# Patient Record
Sex: FEMALE | Race: AMERICAN INDIAN OR ALASKA NATIVE | Employment: PART TIME | URBAN - METROPOLITAN AREA
[De-identification: names, ages, dates, MRNs, and addresses within clinical notes are randomized per-mention and may not be internally consistent; named-entity substitution may affect disease eponyms.]

---

## 2018-04-18 ENCOUNTER — TRANSFERRED RECORDS (OUTPATIENT)
Dept: HEALTH INFORMATION MANAGEMENT | Facility: CLINIC | Age: 38
End: 2018-04-18

## 2018-07-25 ENCOUNTER — TRANSFERRED RECORDS (OUTPATIENT)
Dept: HEALTH INFORMATION MANAGEMENT | Facility: CLINIC | Age: 38
End: 2018-07-25

## 2018-09-11 ENCOUNTER — TRANSFERRED RECORDS (OUTPATIENT)
Dept: HEALTH INFORMATION MANAGEMENT | Facility: CLINIC | Age: 38
End: 2018-09-11

## 2018-10-25 ENCOUNTER — TRANSFERRED RECORDS (OUTPATIENT)
Dept: HEALTH INFORMATION MANAGEMENT | Facility: CLINIC | Age: 38
End: 2018-10-25

## 2019-02-11 ENCOUNTER — MEDICAL CORRESPONDENCE (OUTPATIENT)
Dept: HEALTH INFORMATION MANAGEMENT | Facility: CLINIC | Age: 39
End: 2019-02-11

## 2019-04-01 ENCOUNTER — MEDICAL CORRESPONDENCE (OUTPATIENT)
Dept: HEALTH INFORMATION MANAGEMENT | Facility: CLINIC | Age: 39
End: 2019-04-01

## 2019-04-05 ENCOUNTER — MEDICAL CORRESPONDENCE (OUTPATIENT)
Dept: HEALTH INFORMATION MANAGEMENT | Facility: CLINIC | Age: 39
End: 2019-04-05

## 2019-04-17 ENCOUNTER — TRANSFERRED RECORDS (OUTPATIENT)
Dept: HEALTH INFORMATION MANAGEMENT | Facility: CLINIC | Age: 39
End: 2019-04-17

## 2019-04-18 ENCOUNTER — TRANSFERRED RECORDS (OUTPATIENT)
Dept: HEALTH INFORMATION MANAGEMENT | Facility: CLINIC | Age: 39
End: 2019-04-18

## 2019-04-20 ENCOUNTER — TRANSFERRED RECORDS (OUTPATIENT)
Dept: HEALTH INFORMATION MANAGEMENT | Facility: CLINIC | Age: 39
End: 2019-04-20

## 2020-11-10 ENCOUNTER — TRANSFERRED RECORDS (OUTPATIENT)
Dept: HEALTH INFORMATION MANAGEMENT | Facility: CLINIC | Age: 40
End: 2020-11-10

## 2021-01-18 ENCOUNTER — TRANSFERRED RECORDS (OUTPATIENT)
Dept: HEALTH INFORMATION MANAGEMENT | Facility: CLINIC | Age: 41
End: 2021-01-18

## 2021-02-09 ENCOUNTER — MEDICAL CORRESPONDENCE (OUTPATIENT)
Dept: HEALTH INFORMATION MANAGEMENT | Facility: CLINIC | Age: 41
End: 2021-02-09

## 2021-02-10 ENCOUNTER — TRANSCRIBE ORDERS (OUTPATIENT)
Dept: OTHER | Age: 41
End: 2021-02-10

## 2021-02-10 DIAGNOSIS — M79.671 BILATERAL LEG AND FOOT PAIN: Primary | ICD-10-CM

## 2021-02-10 DIAGNOSIS — M79.604 BILATERAL LEG AND FOOT PAIN: Primary | ICD-10-CM

## 2021-02-10 DIAGNOSIS — M79.605 BILATERAL LEG AND FOOT PAIN: Primary | ICD-10-CM

## 2021-02-10 DIAGNOSIS — R25.2 CRAMPING OF FEET: ICD-10-CM

## 2021-02-10 DIAGNOSIS — M79.672 BILATERAL LEG AND FOOT PAIN: Primary | ICD-10-CM

## 2021-02-10 DIAGNOSIS — R25.2 LEG CRAMPING: ICD-10-CM

## 2021-03-10 ENCOUNTER — PRE VISIT (OUTPATIENT)
Dept: NEUROLOGY | Facility: CLINIC | Age: 41
End: 2021-03-10

## 2021-03-10 NOTE — TELEPHONE ENCOUNTER
FUTURE VISIT INFORMATION      FUTURE VISIT INFORMATION:    Date: 3/12/2021    Time: 915am    [place] [facility] [place]  REFERRAL INFORMATION:    Referring provider:  Dr. German     Referring providers clinic: [facility]     Reason for visit/diagnosis  Bilateral Leg and Foot Pain    RECORDS REQUESTED FROM:       Clinic name Comments Records Status Imaging Status   [facility]  Dr. German -1/18/2021    Dr. Whipple -9/18/2020 Scanned to Chart N/A          [facility] MR Lumbar Spine-10/2/2017, 11/28/2016, 11/17/2016    CT Head-1/12/2017    CT Lumbar Spine-1/12/2017    CT Cervical Spine-1/12/2017 Care EVerywhere Requested to PACS          [facility]  CT Lumbar Spine-7/25/2018 Scanned to Chart Requested to PACS            3/10/2021-Request for Images faxed to [facility] for Images to be pushed ASAP-MR @ 631am    3/12/2021-[facility] Images now in PACS-MR @ 5am

## 2021-03-12 ENCOUNTER — OFFICE VISIT (OUTPATIENT)
Dept: NEUROLOGY | Facility: CLINIC | Age: 41
End: 2021-03-12
Attending: ORTHOPAEDIC SURGERY
Payer: COMMERCIAL

## 2021-03-12 VITALS
RESPIRATION RATE: 16 BRPM | DIASTOLIC BLOOD PRESSURE: 75 MMHG | HEIGHT: 71 IN | BODY MASS INDEX: 26.46 KG/M2 | HEART RATE: 71 BPM | OXYGEN SATURATION: 99 % | SYSTOLIC BLOOD PRESSURE: 123 MMHG | WEIGHT: 189 LBS

## 2021-03-12 DIAGNOSIS — G25.81 RESTLESS LEGS SYNDROME: ICD-10-CM

## 2021-03-12 DIAGNOSIS — R25.2 CRAMP OF LIMB: ICD-10-CM

## 2021-03-12 DIAGNOSIS — R20.0 SENSORY LOSS: ICD-10-CM

## 2021-03-12 DIAGNOSIS — R25.2 CRAMP OF LIMB: Primary | ICD-10-CM

## 2021-03-12 LAB
ERYTHROCYTE [DISTWIDTH] IN BLOOD BY AUTOMATED COUNT: 13.1 % (ref 10–15)
FERRITIN SERPL-MCNC: 146 NG/ML (ref 12–150)
HBA1C MFR BLD: 5.2 % (ref 0–5.6)
HCT VFR BLD AUTO: 44.5 % (ref 35–47)
HCV AB SERPL QL IA: NONREACTIVE
HGB BLD-MCNC: 14.3 G/DL (ref 11.7–15.7)
MCH RBC QN AUTO: 32.3 PG (ref 26.5–33)
MCHC RBC AUTO-ENTMCNC: 32.1 G/DL (ref 31.5–36.5)
MCV RBC AUTO: 101 FL (ref 78–100)
PLATELET # BLD AUTO: 267 10E9/L (ref 150–450)
RBC # BLD AUTO: 4.43 10E12/L (ref 3.8–5.2)
WBC # BLD AUTO: 13.4 10E9/L (ref 4–11)

## 2021-03-12 PROCEDURE — 82784 ASSAY IGA/IGD/IGG/IGM EACH: CPT | Performed by: PATHOLOGY

## 2021-03-12 PROCEDURE — 82728 ASSAY OF FERRITIN: CPT | Performed by: PATHOLOGY

## 2021-03-12 PROCEDURE — 85027 COMPLETE CBC AUTOMATED: CPT | Performed by: PATHOLOGY

## 2021-03-12 PROCEDURE — 99203 OFFICE O/P NEW LOW 30 MIN: CPT | Performed by: PSYCHIATRY & NEUROLOGY

## 2021-03-12 PROCEDURE — 86334 IMMUNOFIX E-PHORESIS SERUM: CPT | Performed by: PATHOLOGY

## 2021-03-12 PROCEDURE — 36415 COLL VENOUS BLD VENIPUNCTURE: CPT | Performed by: PATHOLOGY

## 2021-03-12 PROCEDURE — 83036 HEMOGLOBIN GLYCOSYLATED A1C: CPT | Performed by: PATHOLOGY

## 2021-03-12 PROCEDURE — 86803 HEPATITIS C AB TEST: CPT | Performed by: PATHOLOGY

## 2021-03-12 RX ORDER — BACLOFEN 10 MG/1
20 TABLET ORAL 3 TIMES DAILY
COMMUNITY
Start: 2021-02-17

## 2021-03-12 RX ORDER — FLUTICASONE PROPIONATE 50 MCG
2 SPRAY, SUSPENSION (ML) NASAL
COMMUNITY
Start: 2021-02-09

## 2021-03-12 RX ORDER — FAMOTIDINE 40 MG/1
40 TABLET, FILM COATED ORAL
COMMUNITY
Start: 2021-02-09 | End: 2022-02-09

## 2021-03-12 RX ORDER — GABAPENTIN 300 MG/1
900 CAPSULE ORAL 3 TIMES DAILY
COMMUNITY
Start: 2021-03-10

## 2021-03-12 RX ORDER — VALACYCLOVIR HYDROCHLORIDE 1 G/1
TABLET, FILM COATED ORAL
COMMUNITY
Start: 2020-10-13

## 2021-03-12 RX ORDER — TRIAMCINOLONE ACETONIDE 1 MG/G
CREAM TOPICAL
COMMUNITY
Start: 2020-03-09

## 2021-03-12 RX ORDER — LEVOTHYROXINE SODIUM 25 UG/1
TABLET ORAL
COMMUNITY
Start: 2020-06-15

## 2021-03-12 RX ORDER — ACETAMINOPHEN 500 MG
1000 TABLET ORAL
COMMUNITY

## 2021-03-12 RX ORDER — AZELASTINE 1 MG/ML
2 SPRAY, METERED NASAL
COMMUNITY
Start: 2021-02-09 | End: 2022-02-09

## 2021-03-12 RX ORDER — LIDOCAINE 50 MG/G
1 PATCH TOPICAL
COMMUNITY

## 2021-03-12 RX ORDER — FEXOFENADINE HCL 180 MG/1
180 TABLET ORAL
COMMUNITY
Start: 2020-11-18

## 2021-03-12 RX ORDER — HYDROCODONE BITARTRATE AND ACETAMINOPHEN 5; 325 MG/1; MG/1
TABLET ORAL
COMMUNITY
Start: 2020-12-15

## 2021-03-12 RX ORDER — NAPROXEN SODIUM 220 MG
440 TABLET ORAL
COMMUNITY

## 2021-03-12 ASSESSMENT — PAIN SCALES - GENERAL: PAINLEVEL: SEVERE PAIN (7)

## 2021-03-12 ASSESSMENT — MIFFLIN-ST. JEOR: SCORE: 1628.43

## 2021-03-12 NOTE — NURSING NOTE
Chief Complaint   Patient presents with     Consult     [facility] New - bilateral leg and foot pain, cramping of feet and legs     Parth Perez

## 2021-03-12 NOTE — PROGRESS NOTES
Service Date: 2021      Danial German MD   [facility]   [address] [place], [place] [id number]       RE: Kevyn Dillon   MRN: [id number]   : 1980      Dear Dr. German:      I saw Kevyn Dillon for neurologic evaluation on 2021.  She is a 40-year-old female here for evaluation of lower extremity cramps and pain.      She indicates this has been an issue for many years.  She will get severe cramps in her feet, thighs and calves.  She does tell me, when she was a child, she had braces on for a while because of short Achilles tendons and also later when she was older had an Achilles tendon release on the left.      The discomfort in her legs is mainly when she is at rest.  It is worse at night.  She does get a feeling like she has to get up and move when she is lying in bed or sitting.      She also reports numbness in her toes.      She has had 5 lumbar spinal surgeries, dating back to age 28.  She has had fusions at L4-L5 and L5-S1.  She now has a spinal stimulator for back pain.      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      She has tried various remedies to help with the cramps and pain.  This has included stretching, physical therapy and Biofreeze.  She currently is using baclofen and gabapentin, which have not been of great relief for her.      I was able to review laboratory testing that has been done over the past couple of years.  She has had a normal CBC, TSH, B12, sedimentation rate, C-reactive protein, CLARI, rheumatoid factor, CCP, CK, and comprehensive metabolic panel, Lyme serology was negative.      PAST MEDICAL HISTORY:  Notable for hypothyroidism, gestational diabetes, and nonalcoholic fatty liver disease.      CURRENT MEDICATIONS:   1.  Tylenol.   2.  Astelin nasal spray.   3.  Baclofen 10 mg 3 times a day.   4.  Vitamin D.   5.  Famotidine.   6.  Allegra.   7.  Flonase.   8.  Gabapentin 900 mg 3 times a day.   9.  Hydrocodone.    10.  Levothyroxine.   11.  Lidoderm patch.   12.  Naproxen.   13.  Kenalog   14.  Valtrex.      ALLERGIES:  She is allergic to codeine, cyclobenzaprine, penicillin, liquid adhesive and adhesive on fentanyl patches.      FAMILY HISTORY:  Notable for her mother having restless legs.  Her mother also had neuropathy, but she was diabetic.      SOCIAL HISTORY:  She works as a .  She does smoke.  She does not use alcohol.      PHYSICAL EXAMINATION:   Examination reveals a patient who is alert and cooperative.  Heart rate 71.  Blood pressure 123/75.     Cranial nerves II-XII are intact.  Motor examination reveals intact strength.  On sensory examination, she has a slight loss of position sense in the toes.  She has near absent vibratory sense in the toes.  She reports decreased pinprick to the ankles.  Finger-to-nose is done well.  Gait is notable for stiffness related to her back.  Reflexes are 1+ in the upper extremities, 1+ at the knees, and absent at the ankles.  Plantar responses are flexor.      IMPRESSION:   1.  Chronic lower extremity pain and cramps.   2.  History of 5 lumbar spinal surgeries.   3.  Possible peripheral neuropathy.   4.  Possible restless leg syndrome.      PLAN:  I discussed further evaluation.  I think in part her symptoms could relate to restless legs, but she is most concerned about the cramps that she has in her legs, which are visible at times.      She is going to have some additional blood work done which will include a hemoglobin A1c, hepatitis C antibody, serum immunofixation, ferritin, and CBC.  I will communicate those blood test results to her when available.     (ADDENDUM 3/15/21: LABS OK MILD ELEVATION WBC UNLIKELY SIGNIFIANT CALLED PATIENT)      She is going to return for lower extremity EMG and nerve conduction studies.      For her cramps, I suggested she start taking vitamin E at a dose of 400 to 800 units a day and also a B vitamin.        Total visit time was 40 minutes.   This included a review of available medical records, history, examination, counseling, care coordination, and documentation.     ADDENDUM 3/19/21: EMG normal. Can't rule out small fiber neuropathy. She is open to exploring this further with skin biopsy.     21: Skin biopsy negative. Discussed with patient. She is taking Vitamin E now which not helping. Advised taking B complex vitamin. Already on Gabapentin and Baclofen. Allergic to cyclobenzaprine. Discussed trying calcium channel blocker but she tells me she runs a low resting heart rate. She really would like to know the cause of her cramps and testing has not identified. She would be interested in another opinion and I am referring her to Dr Trinidad.      Sincerely,            MD DEBBY Mack MD             D: 2021   T: 2021   MT: ROBINSON      Name:     FRED AWAD   MRN:      [id number]        Account:      [id number]   :      1980           Service Date: 2021      Document: [id number]

## 2021-03-15 LAB
IGA SERPL-MCNC: 152 MG/DL (ref 84–499)
IGG SERPL-MCNC: 860 MG/DL (ref 610–1616)
IGM SERPL-MCNC: 82 MG/DL (ref 35–242)
PROT PATTERN SERPL IFE-IMP: NORMAL

## 2021-03-18 ENCOUNTER — OFFICE VISIT (OUTPATIENT)
Dept: NEUROLOGY | Facility: CLINIC | Age: 41
End: 2021-03-18
Attending: PSYCHIATRY & NEUROLOGY
Payer: COMMERCIAL

## 2021-03-18 DIAGNOSIS — R20.0 SENSORY LOSS: ICD-10-CM

## 2021-03-18 DIAGNOSIS — R25.2 CRAMP OF LIMB: ICD-10-CM

## 2021-03-18 PROCEDURE — 95911 NRV CNDJ TEST 9-10 STUDIES: CPT | Performed by: PSYCHIATRY & NEUROLOGY

## 2021-03-18 PROCEDURE — 95885 MUSC TST DONE W/NERV TST LIM: CPT | Performed by: PSYCHIATRY & NEUROLOGY

## 2021-03-18 NOTE — PROGRESS NOTES
[facility]  Electrodiagnostic Laboratory    Nerve Conduction & EMG Report          Patient: Kevyn Dillon  YOB: 1980  Patient ID: [id number]  Age: 40 Years 10 Months  Gender: Female    Referring Physician: Thien Duke MD      History & Examination:    40 year old woman with chief complaint of cramping in her hands and feet, accompanied by tingling. Her mother (and possibly her son) have the same symptoms. Query polyneuropathy, myopathy, myotonic disorder, or other explanation for chief complaint.     Techniques:    Sensory and motor conduction studies were done with surface recording electrodes. EMG was done with a concentric needle electrode.     Results:    Left median, left superficial peroneal, and bilateral sural antidromic sensory NCSs were normal. Left median, bilateral deep peroneal, and bilateral tibial motor NCSs were normal without significant side-to-side differences. EMG of left TA, medial gastrocnemius, vastus lateralis, FDI, triceps, and deltoid was normal.     Interpretation:    Normal study. No electrodiagnostic evidence of large fiber polyneuropathy, myopathy, myotonic disorder, or peripheral nerve hyperexcitability syndrome. A purely small fiber neuropathy cannot be excluded.     EMG Physician:    Alvin Trinidad MD         Sensory NCS      Nerve / Sites Rec. Site Onset Peak NP Amp Ref. PP Amp Dist Alli Ref. Temp     ms ms  V  V  V cm m/s m/s  C   L MEDIAN - Dig II Anti      Wrist Dig II 2.24 2.92 26.7 10.0 43.4 14 62.5 48.0 32.5   L SURAL - Lat Mall      Calf Ankle 2.97 3.75 20.4 8.0 26.2 14 47.2 38.0 31.5   R SURAL - Lat Mall      Calf Ankle 2.92 3.65 14.0 8.0 15.1 14 48.0 38.0 31.4   L SUP PERONEAL      Lat Leg Berg 2.81 3.65 11.0  11.2 12.5 44.4 38.0 31.5       Motor NCS      Nerve / Sites Rec. Site Lat Ref. Amp Ref. Rel Amp Dist Alli Ref. Dur. Area Temp.     ms ms mV mV % cm m/s m/s ms %  C   L MEDIAN - APB      Wrist APB 2.86 4.40 12.5 5.0 100 8   6.04  100 32.5      Elbow APB 6.15  11.0  87.6 21.5 65.5 48.0 6.51 85.3 32.4   L DEEP PERONEAL - EDB      Ankle EDB 3.33 6.00 7.2 2.5 100 8   5.57 100 31.5      FibHead EDB 9.27  6.8  94.9 30.5 51.4 38.0 5.99 95.8 31.4      Pop Fos EDB 11.41  6.8  94.4 9.5 44.5 38.0 5.94 96.9 31.4   R DEEP PERONEAL - EDB      Ankle EDB 3.65 6.00 6.6 2.5 100 8   5.05 100 31.4   L TIBIAL - AH      Ankle AH 3.44 6.00 13.4 4.0 100 8   5.89 100 31.5      Pop Fos AH 11.25  10.2  75.9 38 48.6 38.0 7.76 89.5 31.4   R TIBIAL - AH      Ankle AH 3.39 6.00 10.1 4.0 100 8   6.72 100 31.5       EMG Summary Table     Spontaneous MUAP Recruitment    IA Fib/PSW Fasc H.F. Amp Dur. PPP Pattern   L. TIB ANTERIOR N None None None N N None Normal   L. GASTROCN (MED) N None None None N N None Normal   L. VAST LATERALIS N None None None N N None Normal   L. TRICEPS N None None None N N None Normal   L. FIRST D INTEROSS N None None None N N None Normal   L. DELTOID N None None None N N None Normal

## 2021-03-18 NOTE — LETTER
3/18/2021       RE: Kevyn Dillon  [address]  [facility] [address]     Dear Colleague,    Thank you for referring your patient, Kevyn Dillon, to the [facility] EM[facility] [place] at [facility]. Please see a copy of my visit note below.        [facility]  Electrodiagnostic Laboratory    Nerve Conduction & EMG Report          Patient: Kevyn Dillon  YOB: 1980  Patient ID: [id number]  Age: 40 Years 10 Months  Gender: Female    Referring Physician: Thien Duke MD      History & Examination:    40 year old woman with chief complaint of cramping in her hands and feet, accompanied by tingling. Her mother (and possibly her son) have the same symptoms. Query polyneuropathy, myopathy, myotonic disorder, or other explanation for chief complaint.     Techniques:    Sensory and motor conduction studies were done with surface recording electrodes. EMG was done with a concentric needle electrode.     Results:    Left median, left superficial peroneal, and bilateral sural antidromic sensory NCSs were normal. Left median, bilateral deep peroneal, and bilateral tibial motor NCSs were normal without significant side-to-side differences. EMG of left TA, medial gastrocnemius, vastus lateralis, FDI, triceps, and deltoid was normal.     Interpretation:    Normal study. No electrodiagnostic evidence of large fiber polyneuropathy, myopathy, myotonic disorder, or peripheral nerve hyperexcitability syndrome. A purely small fiber neuropathy cannot be excluded.     EMG Physician:    Alvin Trinidad MD         Sensory NCS      Nerve / Sites Rec. Site Onset Peak NP Amp Ref. PP Amp Dist Alli Ref. Temp     ms ms  V  V  V cm m/s m/s  C   L MEDIAN - Dig II Anti      Wrist Dig II 2.24 2.92 26.7 10.0 43.4 14 62.5 48.0 32.5   L SURAL - Lat Mall      Calf Ankle 2.97 3.75 20.4 8.0 26.2 14 47.2 38.0 31.5   R SURAL - Lat Mall      Calf Ankle 2.92 3.65  14.0 8.0 15.1 14 48.0 38.0 31.4   L SUP PERONEAL      Lat Leg Berg 2.81 3.65 11.0  11.2 12.5 44.4 38.0 31.5       Motor NCS      Nerve / Sites Rec. Site Lat Ref. Amp Ref. Rel Amp Dist Alli Ref. Dur. Area Temp.     ms ms mV mV % cm m/s m/s ms %  C   L MEDIAN - APB      Wrist APB 2.86 4.40 12.5 5.0 100 8   6.04 100 32.5      Elbow APB 6.15  11.0  87.6 21.5 65.5 48.0 6.51 85.3 32.4   L DEEP PERONEAL - EDB      Ankle EDB 3.33 6.00 7.2 2.5 100 8   5.57 100 31.5      FibHead EDB 9.27  6.8  94.9 30.5 51.4 38.0 5.99 95.8 31.4      Pop Fos EDB 11.41  6.8  94.4 9.5 44.5 38.0 5.94 96.9 31.4   R DEEP PERONEAL - EDB      Ankle EDB 3.65 6.00 6.6 2.5 100 8   5.05 100 31.4   L TIBIAL - AH      Ankle AH 3.44 6.00 13.4 4.0 100 8   5.89 100 31.5      Pop Fos AH 11.25  10.2  75.9 38 48.6 38.0 7.76 89.5 31.4   R TIBIAL - AH      Ankle AH 3.39 6.00 10.1 4.0 100 8   6.72 100 31.5       EMG Summary Table     Spontaneous MUAP Recruitment    IA Fib/PSW Fasc H.F. Amp Dur. PPP Pattern   L. TIB ANTERIOR N None None None N N None Normal   L. GASTROCN (MED) N None None None N N None Normal   L. VAST LATERALIS N None None None N N None Normal   L. TRICEPS N None None None N N None Normal   L. FIRST D INTEROSS N None None None N N None Normal   L. DELTOID N None None None N N None Normal                                Again, thank you for allowing me to participate in the care of your patient.      Sincerely,    Alvin Trinidad MD

## 2021-03-19 DIAGNOSIS — G60.3 IDIOPATHIC PROGRESSIVE POLYNEUROPATHY: Primary | ICD-10-CM

## 2021-04-06 ENCOUNTER — DOCUMENTATION ONLY (OUTPATIENT)
Dept: NEUROLOGY | Facility: CLINIC | Age: 41
End: 2021-04-06

## 2021-04-06 ENCOUNTER — OFFICE VISIT (OUTPATIENT)
Dept: NEUROLOGY | Facility: CLINIC | Age: 41
End: 2021-04-06
Attending: PSYCHIATRY & NEUROLOGY
Payer: COMMERCIAL

## 2021-04-06 DIAGNOSIS — G60.3 IDIOPATHIC PROGRESSIVE POLYNEUROPATHY: ICD-10-CM

## 2021-04-06 PROCEDURE — 11104 PUNCH BX SKIN SINGLE LESION: CPT | Performed by: PSYCHIATRY & NEUROLOGY

## 2021-04-06 PROCEDURE — 11105 PUNCH BX SKIN EA SEP/ADDL: CPT | Performed by: PSYCHIATRY & NEUROLOGY

## 2021-04-06 NOTE — PROGRESS NOTES
Procedure note: Skin biopsy. Indication: sensory disturbance, skin. After obtaining informed consent, 3 mm PUNCH skin biopsies were performed over the extensor digitorum brevis muscle of the left foot, and the left medial calf, about 10 cm distally to the medial knee joint. 1% lidocaine anesthesia and betadine sterile prep were used. The patient tolerated the procedure well. Wound care and patient education were provided by VIMAL Lofton. Following completion of the procedure, both the performing physician and the assistant, independently verified the presence of one skin sample in each of the two (2) vials sent to the Lab. The procedure was performed by Neuromuscular Fellow Dr Rodriguez under my continuous supervision at all stages. Alivn Trinidad MD

## 2021-04-06 NOTE — LETTER
4/6/2021       RE: Kevny Dillon  [address]  [facility] [address]     Dear Colleague,    Thank you for referring your patient, Kevyn Dillon, to the [facility] EMG CLINIC [place] at [facility]. Please see a copy of my visit note below.    Procedure note: Skin biopsy. Indication: sensory disturbance, skin. After obtaining informed consent, 3 mm PUNCH skin biopsies were performed over the extensor digitorum brevis muscle of the left foot, and the left medial calf, about 10 cm distally to the medial knee joint. 1% lidocaine anesthesia and betadine sterile prep were used. The patient tolerated the procedure well. Wound care and patient education were provided by VIMAL Lofton. Following completion of the procedure, both the performing physician and the assistant, independently verified the presence of one skin sample in each of the two (2) vials sent to the Lab. The procedure was performed by Neuromuscular Fellow Dr Rodriguez under my continuous supervision at all stages. Alvin Trinidad MD        Again, thank you for allowing me to participate in the care of your patient.      Sincerely,    Alvin Trinidad MD

## 2021-04-06 NOTE — PROGRESS NOTES
After two-point verification two skin biopsy samples, left foot and left calf, were sent via tube system to the core lab at the [facility] to be couriered to [place] for processing. [facility] was notified via email to expect these samples. Proper paperwork was included.

## 2021-04-19 LAB — LAB SCANNED RESULT: NORMAL

## 2021-04-21 DIAGNOSIS — R25.2 MUSCLE CRAMPS: Primary | ICD-10-CM

## 2021-05-01 ENCOUNTER — HEALTH MAINTENANCE LETTER (OUTPATIENT)
Age: 41
End: 2021-05-01

## 2021-05-03 NOTE — TELEPHONE ENCOUNTER
RECORDS RECEIVED FROM: Internal   Date of Appt: 8/16/21   NOTES (FOR ALL VISITS) STATUS DETAILS   OFFICE NOTE from referring provider Internal Dr Duke @ [facility] Neurology:  3/12/21   OFFICE NOTE from other specialist N/A    DISCHARGE SUMMARY from hospital N/A    DISCHARGE REPORT from the ER N/A    OPERATIVE REPORT Internal [facility]:  4/6/21  Skin Bx   MEDICATION LIST Internal    IMAGING  (FOR ALL VISITS)     EMG Internal [facility]:  3/18/21   EEG N/A    LUMBAR PUNCTURE N/A    SULAIMAN SCAN N/A    ULTRASOUND (CAROTID BILAT) *VASCULAR* N/A    MRI (HEAD, NECK, SPINE) Received [facility]:  MRI Lumbar Spine 4/18/18    [facility]:  MRI Lumbar Spine 10/2/17   CT (HEAD, NECK, SPINE) Received [facility]:  CT Lumbar Spine 7/25/18    [facility]:  CT Lumbar Spine 1/12/17  CT Head 1/12/17  CT Cervical Spine 1/12/17      Action 5/3/21 MV 2.57pm   Action Taken Imaging request faxed to [facility] for:  MRI Lumbar Spine 4/18/18  CT Lumbar Spine 7/25/18     Action 5/4/21 MV 1.54pm   Action Taken Imaging received from [facility] and resolved in PACS

## 2021-08-16 ENCOUNTER — OFFICE VISIT (OUTPATIENT)
Dept: NEUROLOGY | Facility: CLINIC | Age: 41
End: 2021-08-16
Attending: PSYCHIATRY & NEUROLOGY
Payer: COMMERCIAL

## 2021-08-16 ENCOUNTER — PRE VISIT (OUTPATIENT)
Dept: NEUROLOGY | Facility: CLINIC | Age: 41
End: 2021-08-16

## 2021-08-16 VITALS
BODY MASS INDEX: 26.74 KG/M2 | HEIGHT: 71 IN | HEART RATE: 80 BPM | WEIGHT: 191 LBS | RESPIRATION RATE: 16 BRPM | DIASTOLIC BLOOD PRESSURE: 73 MMHG | SYSTOLIC BLOOD PRESSURE: 108 MMHG | OXYGEN SATURATION: 95 %

## 2021-08-16 DIAGNOSIS — R29.898: Primary | ICD-10-CM

## 2021-08-16 DIAGNOSIS — R25.2 CRAMPS, MUSCLE, GENERAL: ICD-10-CM

## 2021-08-16 DIAGNOSIS — G62.9 SMALL FIBER NEUROPATHY: ICD-10-CM

## 2021-08-16 PROCEDURE — 99214 OFFICE O/P EST MOD 30 MIN: CPT | Mod: GC | Performed by: PSYCHIATRY & NEUROLOGY

## 2021-08-16 RX ORDER — OMEPRAZOLE 40 MG/1
40 CAPSULE, DELAYED RELEASE ORAL
COMMUNITY
Start: 2021-07-26 | End: 2021-08-25

## 2021-08-16 RX ORDER — ONDANSETRON 4 MG/1
TABLET, ORALLY DISINTEGRATING ORAL
COMMUNITY
Start: 2021-07-26

## 2021-08-16 RX ORDER — SUCRALFATE 1 G/1
1 TABLET ORAL EVERY 6 HOURS
COMMUNITY
Start: 2021-07-30 | End: 2021-08-29

## 2021-08-16 ASSESSMENT — ENCOUNTER SYMPTOMS
BLOOD IN STOOL: 0
BACK PAIN: 1
CONSTIPATION: 0
NECK PAIN: 0
ABDOMINAL PAIN: 0
HEARTBURN: 1
VOMITING: 0
JAUNDICE: 0
BLOATING: 0
MUSCLE WEAKNESS: 1
RECTAL PAIN: 0
JOINT SWELLING: 1
ARTHRALGIAS: 1
BOWEL INCONTINENCE: 0
NAUSEA: 0
MYALGIAS: 1
DIARRHEA: 0
STIFFNESS: 1
MUSCLE CRAMPS: 1

## 2021-08-16 ASSESSMENT — PAIN SCALES - GENERAL: PAINLEVEL: EXTREME PAIN (8)

## 2021-08-16 ASSESSMENT — MIFFLIN-ST. JEOR: SCORE: 1632.5

## 2021-08-16 NOTE — PROGRESS NOTES
"History of present illness:   Ms. Bagley is a 41F with history of lower extremity cramps and pain, referred by Dr. Duke for further evaluation.     Her cramps are located in her feet, thighs, and calves and have been ongoing for many years (>5), steadily worsening. Symptoms are worse at rest, not necessarily at night. Stretching makes symptoms worse. Can last up to a half hour, usually 2-3 minutes. During episodes toes/ankles were turn up and can see ripples in her calves. Has occasionally \"annoying\" cramps in her shoulders (sometimes can see twiching occurring down to her wrists). Has tried stretching, PT, heat/cold packs, and biofreeze without success. Currently on baclofen 20mg TID which has helped quite a bit, Vit B, and gabapentin 900mg TID, helping, but not enough    Has urge to move symptoms or moving legs to relieve pain, happening every other night, unrelated to the cramp. Will either wait it out or get up and move around, which helps. Denies stretching to relief symptoms.     Does have numbness/pain in her toes and burning sensation in her hands/feet, which gabapentin helps for. Which has been present for years. Does have chronic back pain, does have radicular pains. Has f/u appointment with spinal surgery. Has achy muscles and joint pain in her hands, all fingers affected with some swelling for the last two years. Has been on steroids twice in the last few months for inflamed joints (hands, feet). Has had two different Rheum evals with negative work-up. Does notice some numbness in her hands, specifically worsening when leaning on her elbows will have symptoms in ulnar distribution. Will have periodic numbness of her fingers for no specific reason as well. States that she would have some weakness after significant activities or after significant cramps (muscle fatigue)    As a child she had to wear braces on her feet for a short achilles tendon, for which she had a release surgery     Previous work-up: " "normal CBC, TSH, B12, sedimentation rate, C-reactive protein, CLARI, rheumatoid factor, CCP, CK, and comprehensive metabolic panel, Lyme serology, serum immunofixation, ferritin, hepatitis C, HbA1c, was negative; EMG normal, Skin biopsy negative    PMH:   R brachial plexopathy (age 27) - sudden onset of no movement/sensation in arm, started to improve over a couple weeks back to normal function in a couple months; no cause identified  Hypothyroid  Nonalcoholic fatty liver disease  LBP    PSH:   Achilles tendon release  5 lumbar spinal surgeries (\"L4-S1 fusion, spinal stimulator)  Left metatarsals (shaved bone)  10 total knee surgeries    Social Hx:   Tobacco 1PPD, no alcohol, no illicit drug   Previously worked as , quit due to symptoms.     Family Hx: Mother - frozen/trigger fingers; hammer toes, cramping; in wheelchair due to calf muscle loss (without diagnosis) and kidney failure age 48 - 8 siblings without symptoms    Medications:   Current Outpatient Medications   Medication     acetaminophen (TYLENOL) 500 MG tablet     azelastine (ASTELIN) 0.1 % nasal spray     baclofen (LIORESAL) 10 MG tablet     cholecalciferol 25 MCG (1000 UT) TABS     famotidine (PEPCID) 40 MG tablet     fexofenadine (ALLEGRA) 180 MG tablet     fluticasone (FLONASE) 50 MCG/ACT nasal spray     gabapentin (NEURONTIN) 300 MG capsule     HYDROcodone-acetaminophen (NORCO) 5-325 MG tablet     levothyroxine (SYNTHROID/LEVOTHROID) 25 MCG tablet     lidocaine (LIDODERM) 5 % patch     omeprazole (PRILOSEC) 40 MG DR capsule     ondansetron (ZOFRAN-ODT) 4 MG ODT tab     sucralfate (CARAFATE) 1 GM tablet     triamcinolone (KENALOG) 0.1 % external cream     valACYclovir (VALTREX) 1000 mg tablet     naproxen sodium (ANAPROX) 220 MG tablet     No current facility-administered medications for this visit.   Vitamin B complex    Physical Exam  /73   Pulse 80   Resp 16   Ht 1.803 m (5' 11\")   Wt 86.6 kg (191 lb)   SpO2 95%   BMI 26.64 kg/m  "   General: flat feet, hammertoes present bilaterally  Mental state: Alert, appropriate, speech, language, and thought content normal.     Cranial nerves II-XII normal.    Sensory examination: Decrease PP in right medial forearm/hand. Romberg normal, but with agitation has outstepping     Right Left   Light touch Normal Normal   Vibration (timed)  6 toe  12 finger 6 toe  12 finger   Vibration (Rydell-Seiffer)     Temp     Pin Decreased until MM Decreased until MM   Pos intact intact   Legend:   MM = medial malleolus, TT = tibial tuberosity, K = patella, MCP = MCP joint  MF = mid-foot, DC = distal calf, MC = mid calf, PC = proximal calf      Motor examination:  Some fluctuation with muscle testing limiting accurate testing   Right Left   Shoulder abduction  5 5   Elbow extension 5 5   Elbow flexion 5 5   Wrist extension  5 5   Finger extension >4 >4   FDI >4 >4   APB >4 >4   Hip flexion >4 >4   Knee flexion >4 >4   Knee extension >4 >4   Dorsiflexion >4 >4   Plantar flexion  EHL 5  >4 5  5   A=atrophy  Can but difficult (twisting) standing from chair without arms  Tone normal     Reflexes:   Right Left   Biceps 2 2   BRD 2 2   Triceps 2 2   Callie 2 2   Patellar 1+ 1+   Achilles 0 0      Coordination:  Finger-nose normal.  Heel-shin normal.    Gait:  Cautious, flat footed; can toe walk, unable to heel walk. Tandem able but cautious    Additional tests: none    Impression:  Ms. Bagley is a 41 year old F with history of lower extremity cramps and pain, referred by Dr. Duke for further evaluation. Patient has had extensive work-up thus far without evidence of nerve (large fiber or small fiber) damage in addition to the cramping sensation. Her examination shows a length-dependent neuropathy, which does not correlate with her work-up. There is fluctuating weakness that limits her motor exam testing.  Has failed Vit E, Vit B, gabapentin, flexeril. Is currently on  Baclofen for management. Could try mexiletine or  oxcarbazepine in the future, but would like to obtain further work-up given complexity of the case first. Her reduced ankle reflexes are most likely due to her previous lumbar surgeries, specifically affecting S1, given otherwise normal work-up.  In regards to her neuropathy, unclear etiology given normal work-up thus far. Given clear discrepancy between test results (EMG, skin biopsy) and clinical exam findings, re-assessment is required. Concern for possible genetic causes, given her similar symptoms in her mom. Will repeat skin biopsy at this time given incongruence. Will obtain MRI of calf muscles for possible mild myopathy, especially given no neurogenic evidence on needle exam as unclear explanation.    In regards to her achiness in her hands, unclear etiology at this time given previously normal work-up. Her symptoms of numbness/pain that starts in the elbow and goes down to the wrist could be related to ulnar neuropathy, especially given triggers. Unfortunately the ulnar nerve was not evaluation on her recent NCS/EMG. Could start with elbow bracing at night and if not improved over a couple months repeat study to assess for damage. Also describes RLS-like symptoms separate from her cramps. No abnormal ferritin to suggest need for iron replacement, could trial dopaminergic symptoms if bothersome enough to warrant treatment.  May continue to follow-up with referring provider for medication management.     Recommendations:  - skin biopsy- repeat from two sites in another lab (eg [facility])  - MRI lower legs- evaluate anterior tibial compartment muscles for signs of myopathy, also evaluate ankle tendons to explain inability to dorsiflex ankles  - genetic counselor referral for possible genetic testing for familial neuropathies  - additional medications possible for cramps: mexiletine or oxcarbazepine, defer until after work-up     Seen and discussed with Dr. Trinidad. His addendum follows    Tamanna Lloyd  MD  Neuromuscular Fellow    ATTENDING ADDENDUM: Patient seen and examined with Fellow Dr Lloyd at the [facility]. Agree with her impression and recommendations. Mrs Dillon is a quite challenging patient. She reports leg cramps and possibly RLS symptoms for at least 5 years. Mother had similar symptoms. She has been incapable of dorsiflexing her ankles since childhood. In addition to this finding her exam shows a clear and reproducible distal to proximal gradient of vibration, and pinprick sensation on both sides, and ankle jerks are absent whereas knee jerks are present (1+ to 2+). Surprisingly, the needle EMG examination of all muscles, including ankle dorsiflexor (TA) was normal, and so were nerve conduction studies and skin biopsy. Based on exam, it is hard to believe that patient does NOT have a small fiber neuropathy therefore the skin biopsy will be repeated in another lab. I will also image her calf muscles to see if there are any abnormalities suggestive of myopathy- again, none detected on EMG. Will also consult genetics to assess for familial neuropathies as mother had similar symptoms. Will defer symptomatic treatment until the above workup is completed. Patient is in agreement with this plan.     Billing MDM level 4 (moderate) with 1) Problems assessed: One undiagnosed problem with uncertain prognosis (cramps, etiology, unclear) and 2) review of the results of 3+ unique tests (see above) and ordering 2 unique tests today (skin biopsy, MRi tibia/fibula).    Alvin Trinidad MD, FAAN

## 2021-08-16 NOTE — LETTER
"8/16/2021       RE: Kevyn Dillon  [address]  [place] [address]     Dear Colleague,    Thank you for referring your patient, Kevyn Dillon, to the [facility] [place] at [facility]. Please see a copy of my visit note below.    History of present illness:   Ms. Bagley is a 41F with history of lower extremity cramps and pain, referred by Dr. Duke for further evaluation.     Her cramps are located in her feet, thighs, and calves and have been ongoing for many years (>5), steadily worsening. Symptoms are worse at rest, not necessarily at night. Stretching makes symptoms worse. Can last up to a half hour, usually 2-3 minutes. During episodes toes/ankles were turn up and can see ripples in her calves. Has occasionally \"annoying\" cramps in her shoulders (sometimes can see twiching occurring down to her wrists). Has tried stretching, PT, heat/cold packs, and biofreeze without success. Currently on baclofen 20mg TID which has helped quite a bit, Vit B, and gabapentin 900mg TID, helping, but not enough    Has urge to move symptoms or moving legs to relieve pain, happening every other night, unrelated to the cramp. Will either wait it out or get up and move around, which helps. Denies stretching to relief symptoms.     Does have numbness/pain in her toes and burning sensation in her hands/feet, which gabapentin helps for. Which has been present for years. Does have chronic back pain, does have radicular pains. Has f/u appointment with spinal surgery. Has achy muscles and joint pain in her hands, all fingers affected with some swelling for the last two years. Has been on steroids twice in the last few months for inflamed joints (hands, feet). Has had two different Rheum evals with negative work-up. Does notice some numbness in her hands, specifically worsening when leaning on her elbows will have symptoms in ulnar distribution. Will have " "periodic numbness of her fingers for no specific reason as well. States that she would have some weakness after significant activities or after significant cramps (muscle fatigue)    As a child she had to wear braces on her feet for a short achilles tendon, for which she had a release surgery     Previous work-up: normal CBC, TSH, B12, sedimentation rate, C-reactive protein, CLARI, rheumatoid factor, CCP, CK, and comprehensive metabolic panel, Lyme serology, serum immunofixation, ferritin, hepatitis C, HbA1c, was negative; EMG normal, Skin biopsy negative    PMH:   R brachial plexopathy (age 27) - sudden onset of no movement/sensation in arm, started to improve over a couple weeks back to normal function in a couple months; no cause identified  Hypothyroid  Nonalcoholic fatty liver disease  LBP    PSH:   Achilles tendon release  5 lumbar spinal surgeries (\"L4-S1 fusion, spinal stimulator)  Left metatarsals (shaved bone)  10 total knee surgeries    Social Hx:   Tobacco 1PPD, no alcohol, no illicit drug   Previously worked as , quit due to symptoms.     Family Hx: Mother - frozen/trigger fingers; hammer toes, cramping; in wheelchair due to calf muscle loss (without diagnosis) and kidney failure age 47 - 8 siblings without symptoms    Medications:   Current Outpatient Medications   Medication     acetaminophen (TYLENOL) 500 MG tablet     azelastine (ASTELIN) 0.1 % nasal spray     baclofen (LIORESAL) 10 MG tablet     cholecalciferol 25 MCG (1000 UT) TABS     famotidine (PEPCID) 40 MG tablet     fexofenadine (ALLEGRA) 180 MG tablet     fluticasone (FLONASE) 50 MCG/ACT nasal spray     gabapentin (NEURONTIN) 300 MG capsule     HYDROcodone-acetaminophen (NORCO) 5-325 MG tablet     levothyroxine (SYNTHROID/LEVOTHROID) 25 MCG tablet     lidocaine (LIDODERM) 5 % patch     omeprazole (PRILOSEC) 40 MG DR capsule     ondansetron (ZOFRAN-ODT) 4 MG ODT tab     sucralfate (CARAFATE) 1 GM tablet     triamcinolone (KENALOG) 0.1 % " "external cream     valACYclovir (VALTREX) 1000 mg tablet     naproxen sodium (ANAPROX) 220 MG tablet     No current facility-administered medications for this visit.   Vitamin B complex    Physical Exam  /73   Pulse 80   Resp 16   Ht 1.803 m (5' 11\")   Wt 86.6 kg (191 lb)   SpO2 95%   BMI 26.64 kg/m    General: flat feet, hammertoes present bilaterally  Mental state: Alert, appropriate, speech, language, and thought content normal.     Cranial nerves II-XII normal.    Sensory examination: Decrease PP in right medial forearm/hand. Romberg normal, but with agitation has outstepping     Right Left   Light touch Normal Normal   Vibration (timed)  6 toe  12 finger 6 toe  12 finger   Vibration (Rydell-Seiffer)     Temp     Pin Decreased until MM Decreased until MM   Pos intact intact   Legend:   MM = medial malleolus, TT = tibial tuberosity, K = patella, MCP = MCP joint  MF = mid-foot, DC = distal calf, MC = mid calf, PC = proximal calf      Motor examination:  Some fluctuation with muscle testing limiting accurate testing   Right Left   Shoulder abduction  5 5   Elbow extension 5 5   Elbow flexion 5 5   Wrist extension  5 5   Finger extension >4 >4   FDI >4 >4   APB >4 >4   Hip flexion >4 >4   Knee flexion >4 >4   Knee extension >4 >4   Dorsiflexion >4 >4   Plantar flexion  EHL 5  >4 5  5   A=atrophy  Can but difficult (twisting) standing from chair without arms  Tone normal     Reflexes:   Right Left   Biceps 2 2   BRD 2 2   Triceps 2 2   Callie 2 2   Patellar 1+ 1+   Achilles 0 0      Coordination:  Finger-nose normal.  Heel-shin normal.    Gait:  Cautious, flat footed; can toe walk, unable to heel walk. Tandem able but cautious    Additional tests: none    Impression:  Ms. Bagley is a 41 year old F with history of lower extremity cramps and pain, referred by Dr. Duke for further evaluation. Patient has had extensive work-up thus far without evidence of nerve (large fiber or small fiber) damage in addition " to the cramping sensation. Her examination shows a length-dependent neuropathy, which does not correlate with her work-up. There is fluctuating weakness that limits her motor exam testing.  Has failed Vit E, Vit B, gabapentin, flexeril. Is currently on  Baclofen for management. Could try mexiletine or oxcarbazepine in the future, but would like to obtain further work-up given complexity of the case first. Her reduced ankle reflexes are most likely due to her previous lumbar surgeries, specifically affecting S1, given otherwise normal work-up.  In regards to her neuropathy, unclear etiology given normal work-up thus far. Given clear discrepancy between test results (EMG, skin biopsy) and clinical exam findings, re-assessment is required. Concern for possible genetic causes, given her similar symptoms in her mom. Will repeat skin biopsy at this time given incongruence. Will obtain MRI of calf muscles for possible mild myopathy, especially given no neurogenic evidence on needle exam as unclear explanation.    In regards to her achiness in her hands, unclear etiology at this time given previously normal work-up. Her symptoms of numbness/pain that starts in the elbow and goes down to the wrist could be related to ulnar neuropathy, especially given triggers. Unfortunately the ulnar nerve was not evaluation on her recent NCS/EMG. Could start with elbow bracing at night and if not improved over a couple months repeat study to assess for damage. Also describes RLS-like symptoms separate from her cramps. No abnormal ferritin to suggest need for iron replacement, could trial dopaminergic symptoms if bothersome enough to warrant treatment.  May continue to follow-up with referring provider for medication management.     Recommendations:  - skin biopsy- repeat from two sites in another lab (eg [facility])  - MRI lower legs- evaluate anterior tibial compartment muscles for signs of myopathy, also evaluate ankle tendons to explain  inability to dorsiflex ankles  - genetic counselor referral for possible genetic testing for familial neuropathies  - additional medications possible for cramps: mexiletine or oxcarbazepine, defer until after work-up     Seen and discussed with Dr. Trinidad. His addendum follows    Tamanna Lloyd MD  Neuromuscular Fellow    ATTENDING ADDENDUM: Patient seen and examined with Fellow Dr Lloyd at the [facility]. Agree with her impression and recommendations. Mrs Dillon is a quite challenging patient. She reports leg cramps and possibly RLS symptoms for at least 5 years. Mother had similar symptoms. She has been incapable of dorsiflexing her ankles since childhood. In addition to this finding her exam shows a clear and reproducible distal to proximal gradient of vibration, and pinprick sensation on both sides, and ankle jerks are absent whereas knee jerks are present (1+ to 2+). Surprisingly, the needle EMG examination of all muscles, including ankle dorsiflexor (TA) was normal, and so were nerve conduction studies and skin biopsy. Based on exam, it is hard to believe that patient does NOT have a small fiber neuropathy therefore the skin biopsy will be repeated in another lab. I will also image her calf muscles to see if there are any abnormalities suggestive of myopathy- again, none detected on EMG. Will also consult genetics to assess for familial neuropathies as mother had similar symptoms. Will defer symptomatic treatment until the above workup is completed. Patient is in agreement with this plan.     Billing MDM level 4 (moderate) with 1) Problems assessed: One undiagnosed problem with uncertain prognosis (cramps, etiology, unclear) and 2) review of the results of 3+ unique tests (see above) and ordering 2 unique tests today (skin biopsy, MRi tibia/fibula).    Alvin Trinidad MD, FAAN

## 2021-08-16 NOTE — NURSING NOTE
Chief Complaint   Patient presents with     Consult     [facility] New Neuropathy - Muscle cramps     Parth Perez

## 2021-08-25 ENCOUNTER — TELEPHONE (OUTPATIENT)
Dept: CONSULT | Facility: CLINIC | Age: 41
End: 2021-08-25

## 2021-08-25 NOTE — TELEPHONE ENCOUNTER
LVM asking patient to call me back directly to schedule [facility] only visit with Dixie or Maryann in adult Neuro.

## 2021-08-26 NOTE — TELEPHONE ENCOUNTER
Appointment scheduled.     Future Appointments   Date Time Provider Department Center   8/27/2021  2:00 PM Dixie Rudolph [facility]

## 2021-08-27 ENCOUNTER — VIRTUAL VISIT (OUTPATIENT)
Dept: NEUROLOGY | Facility: CLINIC | Age: 41
End: 2021-08-27
Payer: COMMERCIAL

## 2021-08-27 DIAGNOSIS — Z71.83 ENCOUNTER FOR NONPROCREATIVE GENETIC COUNSELING AND TESTING: ICD-10-CM

## 2021-08-27 DIAGNOSIS — G62.9 SMALL FIBER NEUROPATHY: Primary | ICD-10-CM

## 2021-08-27 DIAGNOSIS — Z13.71 ENCOUNTER FOR NONPROCREATIVE GENETIC COUNSELING AND TESTING: ICD-10-CM

## 2021-08-27 PROCEDURE — 96040 PR GENETIC COUNSELING, EACH 30 MIN: CPT | Mod: GC | Performed by: GENETIC COUNSELOR, MS

## 2021-08-27 RX ORDER — TRAMADOL HYDROCHLORIDE 50 MG/1
50 TABLET ORAL
COMMUNITY
Start: 2021-08-23

## 2021-08-27 RX ORDER — OMEPRAZOLE 40 MG/1
40 CAPSULE, DELAYED RELEASE ORAL
COMMUNITY
Start: 2021-07-26

## 2021-08-27 NOTE — PROGRESS NOTES
Name:  Kevyn Dillon  :   1980  MRN:   [id number]  Date of service: Aug 27, 2021  Referring Provider: Alvin Trinidad    Genetic Counseling Consultation Note    Presenting Information:  A consultation in the [facility] was requested for Kevyn, a 41 year old female, for evaluation of hereditary neuropathy symptoms.  This consultation was requested by Dr. Trinidad in Adult Neurology at the patient's visit on 2021.    Kevyn attended this visit conducted by video alone.    History is obtained from Kevyn and the medical record. I met with the family at the request of Dr. Trinidad to obtain a personal and family history, discuss possible genetic contributions to her symptoms, and to obtain informed consent for genetic testing.    Personal History:   For additional details, review note on 2021 from Dr. Trinidad.  To summarize, Kevyn has a history of lower extremity cramps and pain.  Kevyn reports that the cramps have been occurring for greater than 5 years and have been getting progressively worse.  Kevyn does have numbness in her toes and a burning sensation in her hands and feet.  Kevyn had a short achilles tendon noted in childhood and had release surgery for this (24 y/o).  Kevyn reports she has always been clumsy. She has a history of degenerative disc disease.  Kevyn has been diagnosed with fibromyalgia through Rheumatology.    Kevyn has hammertoes; 4/5 toes don't the ground (big toe does) and this has gotten progressively worse    Kevyn also has a history of hypothyroidism and non alcoholic fatty liver disease.  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 Kevyn has had 4 miscarriages.    She has had 5 lumbar spinal surgeries, dating back to age 28.  She has had fusions at L4-L5 and L5-S1.  She now has a spinal stimulator for back pain.     [facility]    Nerve Conduction & EMG Report    3/18/2021    Interpretation: Normal study. No  electrodiagnostic evidence of large fiber polyneuropathy, myopathy, myotonic disorder, or peripheral nerve hyperexcitability syndrome. A purely small fiber neuropathy cannot be excluded.     Skin Biopsy    Negative/normal    Previous Genetic Testing  Kevyn has never had genetic testing.    Of note, Kevyn was evaluated at the [facility] in 2021 for connective tissue disorders due to her history of hypermobile joints and chronic pain.  Kevyn has a Beighton score of 3/9 and does not meet criteria for generalized joint hypermobility.    Family History:   Mother - frozen/trigger fingers; hammer toes, cramping; in wheelchair due to calf muscle loss (without diagnosis) and kidney failure age 47 - 8 siblings without symptoms    A standard three generation pedigree was obtained and is scanned into the medical record.  History pertinent to referral is underlined.    Children:    18 y/o daughter, healthy    15 y/o son, history of hammertoes, muscle cramps, ?clumsy, and autism (mother describes as level 2 - didn't talk in sentences until 6, diagnosed with sensory processing skills disorder, short term memory loss, no history of genetic testing)    Siblings:     Full siblings:    2 brothers    43 y/o brother, history of back problems    2 nieces/nephews, healthy    32 y/o brother, had to get growth plates stapled because legs were bowing. Hands get swollen and sore and burn. Normal toes    1 nephew, 8 y/o with severe cramping in legs    1 nephew, 4 y/o, that is extremely clumsy    5 sisters    27 y/o sister, history of lupus    3 nieces/nephews, healthy    31 y/o sister, history of surgery on knees x2 (stapling of growth plates)    10 y/o and 3 y/o niece/nephew, healthy    37 y/o sister, similar health problems to Bear (non alcoholic fatty liver disease, thyroid disease, brachial plexus palsy, elbow locks up, hammertoes, and cramping)    8 y/o nephew, healthy    43 y/o sister, limited information d/t history of alcoholism    22 y/o  nephew, history of muscle cramping    18 y/o nephew, healthy    47 y/o sister, history of endometriosis, severe hypothyroidism, short stature for family    22 y/o niece, history of severe endometriosis and severe hypothyroidism    1 health niece/nephew, healthy    Paternal half siblings:     1 half brother, history of high cholesterol. 2 total heart attacks (first at 40), triple bypass surgery    2 half sisters    No contact with one sister    51 y/o sister, healthy    1 niece, history of lupus    3 nieces/nephews, healthy    Paternal:    Father:  at 61 y/o d/t heart attack. History of heart disease, high cholesterol, thyroid cancer, and non-alcoholic fatty liver disease    Paternal grandfather:  at 69 y/o d/t heart attack    Paternal grandmother:  at 77 d/t pneumonia    Paternal aunts/uncles:     4 brothers    1 sister    1 half-sister    Maternal:     Mother: 64 y/o, 39 y/o when hands got stuck in claws, started using a wheelchair at 60 years old d/t (can go 50 feet with walker), history of kidney failure at 46 y/o, chronic pancreatitis, gastroparesis, cataracts, severe back problems, hammertoes, history of cramping earlier in life    Maternal grandfather:  at 42 y/o d/t heart failure    Maternal grandmother: 86 y/o, history of hands stuck in trigger fingers, hypoglycemia, hypothyroidism, minor hammertoes    Maternal aunts/uncles:     5 brothers, all healthy    Maternal cousins: 6 total, all reported healthy    There are no additional reports of family members with autism, intellectual disability developmental delays, recurrent miscarriages, birth . Paternal and maternal ancestry is of  descent.   Consanguinity is denied, [place]    Genetic Counseling Discussion:  Our nervous system is composed of two parts: the central nervous system and the peripheral nervous system.  The central nervous system refers to the brain and spinal cord.  The peripheral nervous  system refers to the nerves that branch off from the spinal cord and connect to all the parts of our body.  The central nervous system and peripheral nervous system are in constant communication, sending signals to and from each other.  Neuropathy is a medical condition that refers to the damage or reduced function of nerves.  One subset of neuropathy is peripheral neuropathy, referring to neuropathy of the peripheral nervous system.  Peripheral neuropathy can be described as mononeuropathy (only one nerve affected, like carpal tunnel syndrome).  However, most individuals with peripheral neuropathy have polyneuropathy, meaning that multiple nerves are affected.    Neuropathy can be due to many causes, including both acquired forms and genetic forms.  Some examples of acquired causes of peripheral neuropathy are trauma, diabetes, and autoimmune disease.  However, there are countless other causes of acquired peripheral neuropathy.  Genetic causes of peripheral neuropathy are much rarer than acquired forms of neuropathy.  Some examples include transthyretin (TTR) amyloidosis and hereditary neuropathy with liability to pressure palsies (HNPP).  When neuropathy is due to a genetic cause, there may or may not be a family history of neuropathy or other symptoms associated with the condition.    At this time, we are unable to target genetic testing for a specific condition or gene for Bear.  In situations like this, we recommend examining numerous genes associated with the presenting symptom.  For Bear, we are targeting genes associated with neuropathy, like the [employer] Comprehensive Neuropathies Panel which includes 102 genes.  We discussed the details, limitations, and possible outcomes of next generation sequencing gene panels.  There are three types of results:    Negative: meaning no pathogenic variants were identified in the genes that were tested  o A negative result does not rule out the possibility that Bear's  symptoms are due to a genetic etiology    Positive: meaning one (or more) pathogenic variants were identified in the genes that were tested that are associated with Bear's symptoms  o We discussed that a positive result could provide an etiology to Bear's symptoms, give anticipatory guidance as to their potential progression, and clarify risks to family members.  We also discussed that a positive result could indicate that Kevyn is at risks for other health concerns, outside of their presenting symptoms    Uncertain: meaning one (or more) variants were identified in the genes that were tested, but there is not enough data to know if this variant is disease-causing; these are called variants of uncertain significance (VUS)  o In most cases, identification of a VUS does not confirm a diagnosis and does not result in any clinically actionable recommendations.  The variant will be monitored over time to see if more information is known about it in the future.  If a VUS is identified, testing of other relatives may be helpful to provide clarification.    We discussed the potential benefits of genetic testing and why this genetic testing is medically indicated. A positive result will help determine the etiology of neuropathy noted in Bear and could guide medical management for Bear.  If a genetic cause is found for Bear, it will give us a more accurate risk assessment for other family members.  Thus, the recommended testing for Bear  is DIAGNOSTIC testing, and it is NOT investigational.    We discussed the availability of the [employer] Comprehensive Neuropathies Panel which analyzes 102 genes associated with neuropathy. [employer] has partnered with a pharmaceutical company called [employer] to sponsor this testing so it is free of charge to the patient. By participating in the sponsored testing program, the patient agrees to share their genetic testing information with the partner company. The partner company does not receive any  patient identification information (name, date of birth, contact information), but they do receive the ordering providers information and may contact the provider. If Kevyn does not want to participate in the sponsored testing program, this testing can go through the insurance coverage process.  Kevyn would like to move forward with sponsored genetic testing.    Plan:  1. Kevyn expressed verbal informed consent to proceed with genetic testing.  I will mail a buccal collection kit to their home address.  Kevyn will follow the included instructions and mail back to the laboratory.   2. The results of genetic testing are available ~3 weeks after the sample is received by the laboratory.  I will call Bear with the results when they are available. Results will not be left via voicemail, regardless of outcome.  3. Contact information provided.    Dixie Rudolph MS [facility]  Genetic Counselor  Email: [email or web address]  Phone: [phone]  Pager: [phone]    Total Time Spent in Consultation: Approximately 50 minutes    CC: No Letter    References:  National [place] of Neurological Disorders and Stroke (2020). Peripheral Neuropathy Fact Sheet. Retrieved September 7, 2020, from https://www.ninds.nih.gov/Disorders/Patient-Caregiver-Education/Fact-Sheets/Peripheral-Neuropathy-Fact-Sheet.

## 2021-08-27 NOTE — PROGRESS NOTES
Kevyn is a 41 year old who is being evaluated via a billable video visit.      How would you like to obtain your AVS? adicate timeadshart  If the video visit is dropped, the invitation should be resent by: Text to cell phone: [phone]  Will anyone else be joining your video visit? No      Video Start Time: 2:00  Video-Visit Details    Type of service:  Video Visit    Video End Time: 2:50    Originating Location (pt. Location): Home    Distant Location (provider location):  [facility] [place]     Platform used for Video Visit: Hunter    Chief Complaint   Patient presents with     Consult     VIDEO VISIT LIOR Anderson

## 2021-08-29 ENCOUNTER — ANCILLARY PROCEDURE (OUTPATIENT)
Dept: MRI IMAGING | Facility: CLINIC | Age: 41
End: 2021-08-29
Attending: PSYCHIATRY & NEUROLOGY
Payer: COMMERCIAL

## 2021-08-29 DIAGNOSIS — R29.898: ICD-10-CM

## 2021-08-29 PROCEDURE — 73718 MRI LOWER EXTREMITY W/O DYE: CPT | Mod: RT | Performed by: RADIOLOGY

## 2021-09-15 NOTE — PROGRESS NOTES
Kevyn is a 41 year old who is being evaluated via a billable video visit.      How would you like to obtain your AVS? MyChart  If the video visit is dropped, the invitation should be resent by: Text to cell phone: [phone]  Will anyone else be joining your video visit? No  {If patient encounters technical issues they should call [phone] :[id number]}    Video Start Time: {video visit start/end time for provider to select:032420}  Video-Visit Details    Type of service:  Video Visit    Video End Time:{video visit start/end time for provider to select:300023}    Originating Location (pt. Location): Home    Distant Location (provider location):  [facility] [place]     Platform used for Video Visit: [facility]    Chief Complaint   Patient presents with     Consult     VIDEO VISIT LIOR Anderson

## 2021-09-28 ENCOUNTER — DOCUMENTATION ONLY (OUTPATIENT)
Dept: NEUROLOGY | Facility: CLINIC | Age: 41
End: 2021-09-28

## 2021-09-28 ENCOUNTER — OFFICE VISIT (OUTPATIENT)
Dept: NEUROLOGY | Facility: CLINIC | Age: 41
End: 2021-09-28
Payer: COMMERCIAL

## 2021-09-28 ENCOUNTER — TRANSFERRED RECORDS (OUTPATIENT)
Dept: HEALTH INFORMATION MANAGEMENT | Facility: CLINIC | Age: 41
End: 2021-09-28

## 2021-09-28 DIAGNOSIS — G62.9 SMALL FIBER NEUROPATHY: ICD-10-CM

## 2021-09-28 PROCEDURE — 11105 PUNCH BX SKIN EA SEP/ADDL: CPT | Performed by: PSYCHIATRY & NEUROLOGY

## 2021-09-28 PROCEDURE — 11104 PUNCH BX SKIN SINGLE LESION: CPT | Performed by: PSYCHIATRY & NEUROLOGY

## 2021-09-28 NOTE — LETTER
Date:December 2, 2021      Patient was self referred, no letter generated. Do not send.        [facility] Health Information

## 2021-09-28 NOTE — PROGRESS NOTES
After two-point verification, two skin samples; R distal, lateral leg and R proximal, lateral thigh, were sent via Fedex overnight to [facility] for evaluation.

## 2021-09-28 NOTE — PROGRESS NOTES
Procedure note: Skin biopsy. Indication: sensory disturbance, skin. After obtaining informed consent, 3 mm PUNCH skin biopsies were performed at the lower lateral calf, about 10 cm proximally to the lateral malleolus, and at the upper lateral thigh, 20 cm distally to the anterior superior iliac spine. 1% lidocaine anesthesia and betadine sterile prep were used. The patient tolerated the procedure well. Wound care and patient education were provided by VIMAL Lofton. Following completion of the procedure, both the performing physician and the assistant, independently verified the presence of one skin sample in each of the two (2) vials sent to [facility]. The procedure was performed by Neuromuscular Fellow Dr Tamanna Lloyd, under my direct supervision at all stages. Alvin Trinidad MD

## 2021-09-28 NOTE — LETTER
9/28/2021       RE: Kevyn Dillon  [address]  [place] [address]     Dear Colleague,    Thank you for referring your patient, Kevyn Dillon, to the [facility] [place] at [facility]. Please see a copy of my visit note below.    Procedure note: Skin biopsy. Indication: sensory disturbance, skin. After obtaining informed consent, 3 mm PUNCH skin biopsies were performed at the lower lateral calf, about 10 cm proximally to the lateral malleolus, and at the upper lateral thigh, 20 cm distally to the anterior superior iliac spine. 1% lidocaine anesthesia and betadine sterile prep were used. The patient tolerated the procedure well. Wound care and patient education were provided by VIMAL Lofton. Following completion of the procedure, both the performing physician and the assistant, independently verified the presence of one skin sample in each of the two (2) vials sent to [facility]. The procedure was performed by Neuromuscular Fellow Dr Tamanna Lloyd, under my direct supervision at all stages. Alvin Trinidad MD        Again, thank you for allowing me to participate in the care of your patient.      Sincerely,    Alvin Trinidad MD

## 2021-10-11 ENCOUNTER — HEALTH MAINTENANCE LETTER (OUTPATIENT)
Age: 41
End: 2021-10-11

## 2021-10-12 ENCOUNTER — TELEPHONE (OUTPATIENT)
Dept: NEUROLOGY | Facility: CLINIC | Age: 41
End: 2021-10-12

## 2021-10-12 NOTE — TELEPHONE ENCOUNTER
I called Kevyn to discuss the results of genetic testing.  Our initial consultation occurred on 8/27/2021 where informed consent was obtained for genetic testing.    The results of the [facility] Comprehensive Neuropathies Panel were negative. No pathogenic, likely pathogenic, or variants of uncertain significance were identified. This test included sequence analysis and deletion duplication testing of 102 genes.    Kevyn was very disappointment by this news. She reports that genetic testing was her last hope and that she is so frustrated that all testing so far has been negative/normal.    Personal History:   For additional details, review note on 8/27/2021 from myself.  To summarize, Kevyn has a history of lower extremity cramps and pain.  Kevyn reports that the cramps have been occurring for greater than 5 years and have been getting progressively worse.  Kevyn does have numbness in her toes and a burning sensation in her hands and feet.  Kevyn had a short achilles tendon noted in childhood and had release surgery for this (24 y/o).  Kevyn reports she has always been clumsy. She has a history of degenerative disc disease.  Kevyn has been diagnosed with fibromyalgia through Rheumatology.    Kevyn continues to have intense numbness in her legs and cramping.    Family History:   A standard three generation pedigree was obtained and is scanned into the medical record at our consultation on 8/27/2021.  Kevyn reports no updates at this time.    15 y/o son with history of hammertoes, muscle cramps, and autism    37 y/o sister, history of NAFLD, thyroid disease, brachial plexus palsy, hammertoes, and muscle cramping    62 y/o mother, history of kidney failure at 46 y/o, hands locking up, and wheelchair use at 59 y/o    Assessment:  These results do not identify a genetic etiology to Kevyn's health concerns. While these results reduce the likelihood of a genetic contribution, they do not exclude it completely. Many individuals with  neuropathy do not have an identifiable genetic component.    Plan:  1. I will mail Bear a copy of these genetic test results.  2. Continue to follow-up with Neurologist, Dr. Trinidad.    Dixie Rudolph MS [facility]  Genetic Counselor  Email: [email or web address]  Phone: [phone]  Pager: [phone]    Total Time Spent in Consultation: Approximately 10 minutes    CC: No Letter

## 2021-10-14 ENCOUNTER — TRANSFERRED RECORDS (OUTPATIENT)
Dept: HEALTH INFORMATION MANAGEMENT | Facility: CLINIC | Age: 41
End: 2021-10-14

## 2021-11-10 ENCOUNTER — MEDICAL CORRESPONDENCE (OUTPATIENT)
Dept: HEALTH INFORMATION MANAGEMENT | Facility: CLINIC | Age: 41
End: 2021-11-10
Payer: COMMERCIAL

## 2021-11-11 ENCOUNTER — TRANSCRIBE ORDERS (OUTPATIENT)
Dept: MULTI SPECIALTY CLINIC | Facility: CLINIC | Age: 41
End: 2021-11-11

## 2021-11-11 DIAGNOSIS — M25.50 MULTIPLE JOINT PAIN: Primary | ICD-10-CM

## 2021-11-29 ENCOUNTER — OFFICE VISIT (OUTPATIENT)
Dept: NEUROLOGY | Facility: CLINIC | Age: 41
End: 2021-11-29
Payer: COMMERCIAL

## 2021-11-29 ENCOUNTER — LAB (OUTPATIENT)
Dept: LAB | Facility: CLINIC | Age: 41
End: 2021-11-29

## 2021-11-29 VITALS
RESPIRATION RATE: 16 BRPM | SYSTOLIC BLOOD PRESSURE: 124 MMHG | DIASTOLIC BLOOD PRESSURE: 81 MMHG | HEART RATE: 75 BPM | OXYGEN SATURATION: 97 %

## 2021-11-29 DIAGNOSIS — G62.9 SMALL FIBER NEUROPATHY: Primary | ICD-10-CM

## 2021-11-29 DIAGNOSIS — G62.9 SMALL FIBER NEUROPATHY: ICD-10-CM

## 2021-11-29 DIAGNOSIS — M35.00 SICCA SYNDROME (H): ICD-10-CM

## 2021-11-29 LAB — HIV 1+2 AB+HIV1 P24 AG SERPL QL IA: NONREACTIVE

## 2021-11-29 PROCEDURE — 84446 ASSAY OF VITAMIN E: CPT | Mod: 90 | Performed by: PATHOLOGY

## 2021-11-29 PROCEDURE — 83516 IMMUNOASSAY NONANTIBODY: CPT | Mod: 59 | Performed by: PSYCHIATRY & NEUROLOGY

## 2021-11-29 PROCEDURE — 84999 UNLISTED CHEMISTRY PROCEDURE: CPT | Performed by: PSYCHIATRY & NEUROLOGY

## 2021-11-29 PROCEDURE — 84425 ASSAY OF VITAMIN B-1: CPT | Mod: 90 | Performed by: PATHOLOGY

## 2021-11-29 PROCEDURE — 86255 FLUORESCENT ANTIBODY SCREEN: CPT | Performed by: PSYCHIATRY & NEUROLOGY

## 2021-11-29 PROCEDURE — 86235 NUCLEAR ANTIGEN ANTIBODY: CPT | Performed by: PSYCHIATRY & NEUROLOGY

## 2021-11-29 PROCEDURE — 83520 IMMUNOASSAY QUANT NOS NONAB: CPT | Performed by: PSYCHIATRY & NEUROLOGY

## 2021-11-29 PROCEDURE — 87389 HIV-1 AG W/HIV-1&-2 AB AG IA: CPT | Performed by: PSYCHIATRY & NEUROLOGY

## 2021-11-29 PROCEDURE — 82164 ANGIOTENSIN I ENZYME TEST: CPT | Mod: 90 | Performed by: PATHOLOGY

## 2021-11-29 PROCEDURE — 99000 SPECIMEN HANDLING OFFICE-LAB: CPT | Performed by: PATHOLOGY

## 2021-11-29 PROCEDURE — 84182 PROTEIN WESTERN BLOT TEST: CPT | Performed by: PSYCHIATRY & NEUROLOGY

## 2021-11-29 PROCEDURE — 86256 FLUORESCENT ANTIBODY TITER: CPT | Performed by: PSYCHIATRY & NEUROLOGY

## 2021-11-29 PROCEDURE — 36415 COLL VENOUS BLD VENIPUNCTURE: CPT | Performed by: PATHOLOGY

## 2021-11-29 PROCEDURE — 99214 OFFICE O/P EST MOD 30 MIN: CPT | Performed by: PSYCHIATRY & NEUROLOGY

## 2021-11-29 RX ORDER — FLUCONAZOLE 150 MG/1
TABLET ORAL
COMMUNITY
Start: 2021-09-13

## 2021-11-29 ASSESSMENT — ENCOUNTER SYMPTOMS
MUSCLE WEAKNESS: 1
ARTHRALGIAS: 1
STIFFNESS: 1
MYALGIAS: 1
JOINT SWELLING: 1
NECK PAIN: 0
MUSCLE CRAMPS: 1
BACK PAIN: 1

## 2021-11-29 ASSESSMENT — PAIN SCALES - GENERAL: PAINLEVEL: SEVERE PAIN (7)

## 2021-11-29 NOTE — LETTER
2021       RE: Kevyn Dillon  [address]  [place] [address]     Dear Colleague,    Thank you for referring your patient, Kevyn Dillon, to the [facility] [place] at [facility]. Please see a copy of my visit note below.    Service Date: 2021    LUIS Tomlin   [facility]  [address] [place] [address]     RE:  Kevyn Dillon  MRN: [id number]  : 1980    Dear MsIsma Bazzi:    I had the pleasure to see Ms. Kevyn Dillon at the [facility] in followup.  As you know, she is a 41-year-old woman with intractable chronic muscle cramps, which remained unexplained to date.  She has a positive family history of the same problem.  She had extensive serological workup in our institution, which was negative.  She had an EMG that was negative and a skin biopsy at [facility], in 2021 that showed no evidence of small fiber neuropathy.  The epidermal nerve fiber density from the skin of the foot and the calf was normal. However, when I met her in Clinic for a complete exam for the first time on 2021, I noted that she had absent ankle jerks, reduced patellar jerks and clearly reduced distal sensation in her feet, suggestive of neuropathy.  I ended up repeating a skin biopsy to [facility] and the result came back markedly abnormal on 2021 with epidermal nerve fiber density at the thigh of 2.75 per mm with lower limit of normal of 8.3, and 3.89 at the distal calf with lower limit of normal of 4.9.  She had genetic testing for inherited causes of neuropathy on  that was negative ([facility] comprehensive panel).    Her symptoms are unchanged from the last visit.  She tells me she has very intense dry eyes and dry mouth.  Again, she had negative or normal CBC, TSH, B12, sed rate, CRP, CLARI, rheumatoid factor, CCP  antibodies, CK, comprehensive metabolic panel, Lyme serology, serum immunofixation, ferritin, hepatitis C and hemoglobin A1c.    /81   Pulse 75   Resp 16   SpO2 97%     Neuro exam was not repeated.     In summary, Fred has chronic muscle cramps and she was newly found to have small fiber neuropathy by a second skin biopsy sent to [facility]. The results of the second biopsy suggest a non-length dependent process. I told her that this could be a possible explanation for her cramps based on several literature reports in the last decade.  We should expand the workup to include rare autoimmune diseases and other conditions.  We will get an MIGUEL antibody panel today, angiotensin converting enzyme, ANCA, HIV serologies, tissue transglutaminase antibodies for celiac disease, vitamin B1, vitamin E levels, and a [facility] sensory neuropathy antibody panel including TSHDS and FGFR3 antibodies. I will also refer her to ENT for a lip biopsy to rule out seronegative Sjogren syndrome.  I will contact her when I have all those results.      She has started using Flexeril, which has helped her cramps a bit, although they continue to be very uncomfortable at times.  If an autoimmune disease is discovered, we may consider the option of a short-term immunotherapy trial.  She is in agreement with the above plan. Billing is [facility] level 4, moderate based on:    1.  Problems assessed: One chronic condition with exacerbation, progression or side effects of treatment  (newly diagnosed small fiber neuropathy).  2.  Amount/Complexity: Ordering 3 or more unique tests today as above.    Alvin Trinidad MD        D: 2021   T: 2021   MT: JORGE    Name:     FRED AWAD  MRN:      [id number]        Account:      [id number]   :      1980           Service Date: 2021       Document: [id number]

## 2021-11-29 NOTE — PATIENT INSTRUCTIONS
Blood tests today to look for causes (especially autoimmune causes) of small fiber neuropathy  Lip biopsy with Ear Nose Throat doctors to look for an autoimmune disease called seronegative Sjogren syndrome which can cause dry eyes and dry mouth in addition to neuropathy  I will call you when test results are available

## 2021-11-29 NOTE — NURSING NOTE
Chief Complaint   Patient presents with     NEUROPATHY     Return Subspecialty Neuropathy     Parth Perez

## 2021-11-29 NOTE — PROGRESS NOTES
Service Date: 2021    LUIS Tomlin   [facility]  [address] [place] [address]     RE:  Kevyn Dillon  MRN: [id number]  : 1980    Dear Ms. Bazzi:    I had the pleasure to see Ms. Kevyn Dillon at the [facility] in followup.  As you know, she is a 41-year-old woman with intractable chronic muscle cramps, which remained unexplained to date.  She has a positive family history of the same problem.  She had extensive serological workup in our institution, which was negative.  She had an EMG that was negative and a skin biopsy at [facility] , interestingly, in 2021 that showed no evidence of small fiber neuropathy.  The epidermal nerve fiber density from the skin of the foot and the calf was normal. However, when I met her in Clinic for a complete exam for the first time on 2021, I noted that she had absent ankle jerks, reduced patellar jerks and clearly reduced distal sensation in her feet, suggestive of neuropathy.  I ended up repeating a skin biopsy to [facility] and the result came back markedly abnormal on 2021 with epidermal nerve fiber density at the thigh of 2.75 per mm with lower limit of normal of 8.3, and 3.89 at the distal calf with lower limit of normal of 4.9.  She had genetic testing for inherited causes of neuropathy on  that was negative ([facility] comprehensive panel).    Her symptoms are unchanged from the last visit.  She tells me she has very intense dry eyes and dry mouth.  Again, she had negative or normal CBC, TSH, B12, sed rate, CRP, CLARI, rheumatoid factor, CCP antibodies, CK, comprehensive metabolic panel, Lyme serology, serum immunofixation, ferritin, hepatitis C and hemoglobin A1c.    /81   Pulse 75   Resp 16   SpO2 97%     Neuro exam was not repeated.     In summary, Kevyn has chronic muscle cramps and she was newly found to have small fiber neuropathy by a  second skin biopsy sent to [facility]. The results of the second biopsy suggest a non-length dependent process. I told her that this could be a possible explanation for her cramps based on several literature reports in the last decade.  We should expand the workup to include rare autoimmune diseases and other conditions.  We will get an MIGUEL antibody panel today, angiotensin converting enzyme, ANCA, HIV serologies, tissue transglutaminase antibodies for celiac disease, vitamin B1, vitamin E levels, and a [facility] sensory neuropathy antibody panel including TSHDS and FGFR3 antibodies. I will also refer her to ENT for a lip biopsy to rule out seronegative Sjogren syndrome.  I will contact her when I have all those results.      She has started using Flexeril, which has helped her cramps a bit, although they continue to be very uncomfortable at times.  If an autoimmune disease is discovered, we may consider the option of a short-term immunotherapy trial.  She is in agreement with the above plan. Billing is [facility] level 4, moderate based on:    1.  Problems assessed: One chronic condition with exacerbation, progression or side effects of treatment  (newly diagnosed small fiber neuropathy).  2.  Amount/Complexity: Ordering 3 or more unique tests today as above.    Alvin Trinidad MD        D: 2021   T: 2021   MT: JORGE    Name:     FRED AWAD  MRN:      [id number]        Account:      [id number]   :      1980           Service Date: 2021       Document: [id number]

## 2021-11-30 LAB
ACE SERPL-CCNC: 12 U/L
ANCA AB PATTERN SER IF-IMP: NORMAL
C-ANCA TITR SER IF: NORMAL {TITER}
ENA SM IGG SER IA-ACNC: 3.3 U/ML
ENA SM IGG SER IA-ACNC: NEGATIVE
ENA SS-A AB SER IA-ACNC: <0.5 U/ML
ENA SS-A AB SER IA-ACNC: NEGATIVE
ENA SS-B IGG SER IA-ACNC: <0.6 U/ML
ENA SS-B IGG SER IA-ACNC: NEGATIVE
TTG IGA SER-ACNC: 0.3 U/ML
TTG IGG SER-ACNC: <0.6 U/ML
U1 SNRNP IGG SER IA-ACNC: 14 U/ML
U1 SNRNP IGG SER IA-ACNC: POSITIVE

## 2021-12-01 LAB
A-TOCOPHEROL VIT E SERPL-MCNC: 10.4 MG/L
BETA+GAMMA TOCOPHEROL SERPL-MCNC: 2.4 MG/L

## 2021-12-02 LAB — VIT B1 PYROPHOSHATE BLD-SCNC: 159 NMOL/L

## 2021-12-28 LAB
PERFORMING LABORATORY: NORMAL
SPECIMEN STATUS: NORMAL
TEST NAME: NORMAL

## 2022-02-10 ENCOUNTER — TRANSFERRED RECORDS (OUTPATIENT)
Dept: HEALTH INFORMATION MANAGEMENT | Facility: CLINIC | Age: 42
End: 2022-02-10

## 2022-02-22 ENCOUNTER — OFFICE VISIT (OUTPATIENT)
Dept: OTOLARYNGOLOGY | Facility: CLINIC | Age: 42
End: 2022-02-22
Attending: PSYCHIATRY & NEUROLOGY
Payer: COMMERCIAL

## 2022-02-22 VITALS
HEART RATE: 65 BPM | BODY MASS INDEX: 27.16 KG/M2 | SYSTOLIC BLOOD PRESSURE: 105 MMHG | TEMPERATURE: 97.8 F | HEIGHT: 71 IN | WEIGHT: 194 LBS | DIASTOLIC BLOOD PRESSURE: 71 MMHG

## 2022-02-22 DIAGNOSIS — M35.00 SICCA SYNDROME (H): ICD-10-CM

## 2022-02-22 DIAGNOSIS — G62.9 SMALL FIBER NEUROPATHY: ICD-10-CM

## 2022-02-22 PROCEDURE — 88305 TISSUE EXAM BY PATHOLOGIST: CPT | Mod: TC | Performed by: OTOLARYNGOLOGY

## 2022-02-22 PROCEDURE — 42405 BIOPSY OF SALIVARY GLAND: CPT | Performed by: OTOLARYNGOLOGY

## 2022-02-22 PROCEDURE — 88305 TISSUE EXAM BY PATHOLOGIST: CPT | Mod: 26 | Performed by: PATHOLOGY

## 2022-02-22 ASSESSMENT — PAIN SCALES - GENERAL: PAINLEVEL: NO PAIN (0)

## 2022-02-22 NOTE — LETTER
2022       RE: Kevyn Dillon  [address]  [facility] [address]     Dear Colleague,    Thank you for referring your patient, eKvyn Dillon, to the [facility] [place] at [facility]. Please see a copy of my visit note below.      Otolaryngology Clinic      Name: Kevyn Dillon  MRN: [id number]  Age: 41 year old  : 1980  Referring provider: Alvin Trinidad  2022      Chief Complaint:  Consultation    History of Present Illness:   Kevyn Dillon is a 41 year old female with a history of intractable chronic muscle cramps and small fiber neuropathy who presents for consultation regarding biopsy. The patient was seen by Dr. Trinidad who recommended she be seen here for a lip biopsy to rule out seronegative Sjogren syndrome.     Today, the patient reports dry mouth and dry eyes usually, but not in the mornings. She states she has had broken teeth from dental work.     Active Medications:     Current Outpatient Medications:      acetaminophen (TYLENOL) 500 MG tablet, Take 1,000 mg by mouth, Disp: , Rfl:      baclofen (LIORESAL) 10 MG tablet, 20 mg 3 times daily , Disp: , Rfl:      cholecalciferol 25 MCG (1000 UT) TABS, Take 50 mcg by mouth, Disp: , Rfl:      diclofenac (VOLTAREN) 1 % topical gel, Apply 2 g topically, Disp: , Rfl:      fexofenadine (ALLEGRA) 180 MG tablet, Take 180 mg by mouth, Disp: , Rfl:      fluconazole (DIFLUCAN) 150 MG tablet, , Disp: , Rfl:      fluticasone (FLONASE) 50 MCG/ACT nasal spray, Spray 2 sprays in nostril, Disp: , Rfl:      gabapentin (NEURONTIN) 300 MG capsule, Take 900 mg by mouth 3 times daily , Disp: , Rfl:      levothyroxine (SYNTHROID/LEVOTHROID) 25 MCG tablet, TAKE 1 TABLET(25 MCG) BY MOUTH DAILY, Disp: , Rfl:      lidocaine (LIDODERM) 5 % patch, Place 1 patch onto the skin, Disp: , Rfl:      triamcinolone (KENALOG) 0.1 % external cream, APPLY  "EXTERNALLY TO THE AFFECTED AREA 1 TO 2 TIMES DAILY AS NEEDED. AVOID FACE AND GROIN, Disp: , Rfl:      valACYclovir (VALTREX) 1000 mg tablet, TAKE 2 TABLETS(2000 MG) BY MOUTH TWICE DAILY AS NEEDED FOR COLD SORES, Disp: , Rfl:      HYDROcodone-acetaminophen (NORCO) 5-325 MG tablet, TAKE 1 TABLET BY MOUTH EVERY 6 HOURS AS NEEDED FOR PAIN OR CHRONIC PAIN. MAX 4 PER DAY, Disp: , Rfl:      naproxen sodium (ANAPROX) 220 MG tablet, Take 440 mg by mouth, Disp: , Rfl:      omeprazole (PRILOSEC) 40 MG DR capsule, Take 40 mg by mouth, Disp: , Rfl:      ondansetron (ZOFRAN-ODT) 4 MG ODT tab, , Disp: , Rfl:      traMADol (ULTRAM) 50 MG tablet, 50 mg, Disp: , Rfl:       Allergies:   Codeine, Cyclobenzaprine, Liquid adhesive, Fentanyl, and Penicillins      Past Medical History:  No past medical history on file.  There is no problem list on file for this patient.       Past Surgical History:  No past surgical history on file.    Family History:   No family history on file.      Social History:   Social History     Tobacco Use     Smoking status: Current Every Day Smoker     Smokeless tobacco: Never Used   Substance Use Topics     Alcohol use: Not on file     Drug use: Not on file       Review of Systems:   Pertinent items are noted in HPI or as in patient entered ROS below, remainder of complete ROS is negative.   No flowsheet data found.      Physical Exam:   /71 (BP Location: Right arm, Patient Position: Sitting, Cuff Size: Adult Regular)   Pulse 65   Temp 97.8  F (36.6  C) (Temporal)   Ht 1.803 m (5' 11\")   Wt 88 kg (194 lb)   BMI 27.06 kg/m       Constitutional:  The patient was unaccompanied, well-groomed, and in no acute distress.    Skin:  Warm and pink.    Neurologic:  Alert and oriented x 3.  CN's III-XII within normal limits.  Voice normal.   Psychiatric:  The patient's affect was calm, cooperative, and appropriate.    Respiratory:  Breathing comfortably without stridor or exertion of accessory muscles.    Eyes: " Extraocular movement intact.    Head:  Normocephalic and atraumatic.  No lesions or scars.    OC/OP:  Normal tongue, floor of mouth, buccal mucosa, and palate.  No lesions or masses on inspection or palpation.  No abnormal lymph tissue in the oropharynx.  The pterygoid region is non-tender.    Neck:  Supple with normal laryngeal and tracheal landmarks.  The parotid beds were without masses.  No palpable thyroid.  Lymphatic:  There is no palpable lymphadenopathy in the neck.     Salivary Gland Biopsy:  Lip injection 1% lidocaine with epinephrine. Elliptical incison was made in right lower lip. Several minor salivary removed with sharp dissection. Wound closed 4-0 chromic suture.     Assessment and Plan:    ICD-10-CM    1. Small fiber neuropathy  G62.9 OTOLARYNGOLOGY REFERRAL     Surgical Pathology Exam   2. Sicca syndrome (H)  M35.00 OTOLARYNGOLOGY REFERRAL     Surgical Pathology Exam   Bear Woman Stately is a 41 year old female with a history of intractable chronic muscle cramps and small fiber neuropathy who presents for consultation regarding biopsy. I performed a salivary gland biopsy of her right lower lip today. We will follow-up with the results of this.     Follow-up: No follow-ups on file.         Scribe Disclosure:  ILisa, am serving as a scribe to document services personally performed by Jamie Nava MD at this visit, based upon the provider's statements to me. All documentation has been reviewed by the aforementioned provider prior to being entered into the official medical record.         Jamie Nava MD

## 2022-02-22 NOTE — PATIENT INSTRUCTIONS
1. We will call you with the results of the pathology results once we have them.   2. Please call the ENT clinic with any questions,concerns, new or worsening symptoms.    -Clinic number is [phone]   - Brianna's direct line (Dr. Nava's nurse) [phone]

## 2022-02-22 NOTE — PROGRESS NOTES
Otolaryngology Clinic      Name: Kevyn Dillon  MRN: [id number]  Age: 41 year old  : 1980  Referring provider: Alvin Trinidad  2022      Chief Complaint:  Consultation    History of Present Illness:   Kevyn Dillon is a 41 year old female with a history of intractable chronic muscle cramps and small fiber neuropathy who presents for consultation regarding biopsy. The patient was seen by Dr. Trinidad who recommended she be seen here for a lip biopsy to rule out seronegative Sjogren syndrome.     Today, the patient reports dry mouth and dry eyes usually, but not in the mornings. She states she has had broken teeth from dental work.     Active Medications:     Current Outpatient Medications:      acetaminophen (TYLENOL) 500 MG tablet, Take 1,000 mg by mouth, Disp: , Rfl:      baclofen (LIORESAL) 10 MG tablet, 20 mg 3 times daily , Disp: , Rfl:      cholecalciferol 25 MCG (1000 UT) TABS, Take 50 mcg by mouth, Disp: , Rfl:      diclofenac (VOLTAREN) 1 % topical gel, Apply 2 g topically, Disp: , Rfl:      fexofenadine (ALLEGRA) 180 MG tablet, Take 180 mg by mouth, Disp: , Rfl:      fluconazole (DIFLUCAN) 150 MG tablet, , Disp: , Rfl:      fluticasone (FLONASE) 50 MCG/ACT nasal spray, Spray 2 sprays in nostril, Disp: , Rfl:      gabapentin (NEURONTIN) 300 MG capsule, Take 900 mg by mouth 3 times daily , Disp: , Rfl:      levothyroxine (SYNTHROID/LEVOTHROID) 25 MCG tablet, TAKE 1 TABLET(25 MCG) BY MOUTH DAILY, Disp: , Rfl:      lidocaine (LIDODERM) 5 % patch, Place 1 patch onto the skin, Disp: , Rfl:      triamcinolone (KENALOG) 0.1 % external cream, APPLY EXTERNALLY TO THE AFFECTED AREA 1 TO 2 TIMES DAILY AS NEEDED. AVOID FACE AND GROIN, Disp: , Rfl:      valACYclovir (VALTREX) 1000 mg tablet, TAKE 2 TABLETS(2000 MG) BY MOUTH TWICE DAILY AS NEEDED FOR COLD SORES, Disp: , Rfl:      HYDROcodone-acetaminophen (NORCO) 5-325 MG tablet, TAKE 1 TABLET BY MOUTH EVERY 6 HOURS AS NEEDED FOR  "PAIN OR CHRONIC PAIN. MAX 4 PER DAY, Disp: , Rfl:      naproxen sodium (ANAPROX) 220 MG tablet, Take 440 mg by mouth, Disp: , Rfl:      omeprazole (PRILOSEC) 40 MG DR capsule, Take 40 mg by mouth, Disp: , Rfl:      ondansetron (ZOFRAN-ODT) 4 MG ODT tab, , Disp: , Rfl:      traMADol (ULTRAM) 50 MG tablet, 50 mg, Disp: , Rfl:       Allergies:   Codeine, Cyclobenzaprine, Liquid adhesive, Fentanyl, and Penicillins      Past Medical History:  No past medical history on file.  There is no problem list on file for this patient.       Past Surgical History:  No past surgical history on file.    Family History:   No family history on file.      Social History:   Social History     Tobacco Use     Smoking status: Current Every Day Smoker     Smokeless tobacco: Never Used   Substance Use Topics     Alcohol use: Not on file     Drug use: Not on file       Review of Systems:   Pertinent items are noted in HPI or as in patient entered ROS below, remainder of complete ROS is negative.   No flowsheet data found.      Physical Exam:   /71 (BP Location: Right arm, Patient Position: Sitting, Cuff Size: Adult Regular)   Pulse 65   Temp 97.8  F (36.6  C) (Temporal)   Ht 1.803 m (5' 11\")   Wt 88 kg (194 lb)   BMI 27.06 kg/m       Constitutional:  The patient was unaccompanied, well-groomed, and in no acute distress.    Skin:  Warm and pink.    Neurologic:  Alert and oriented x 3.  CN's III-XII within normal limits.  Voice normal.   Psychiatric:  The patient's affect was calm, cooperative, and appropriate.    Respiratory:  Breathing comfortably without stridor or exertion of accessory muscles.    Eyes: Extraocular movement intact.    Head:  Normocephalic and atraumatic.  No lesions or scars.    OC/OP:  Normal tongue, floor of mouth, buccal mucosa, and palate.  No lesions or masses on inspection or palpation.  No abnormal lymph tissue in the oropharynx.  The pterygoid region is non-tender.    Neck:  Supple with normal laryngeal " and tracheal landmarks.  The parotid beds were without masses.  No palpable thyroid.  Lymphatic:  There is no palpable lymphadenopathy in the neck.     Salivary Gland Biopsy:  Lip injection 1% lidocaine with epinephrine. Elliptical incison was made in right lower lip. Several minor salivary removed with sharp dissection. Wound closed 4-0 chromic suture.     Assessment and Plan:    ICD-10-CM    1. Small fiber neuropathy  G62.9 OTOLARYNGOLOGY REFERRAL     Surgical Pathology Exam   2. Sicca syndrome (H)  M35.00 OTOLARYNGOLOGY REFERRAL     Surgical Pathology Exam   Kevyn Woman Santana is a 41 year old female with a history of intractable chronic muscle cramps and small fiber neuropathy who presents for consultation regarding biopsy. I performed a salivary gland biopsy of her right lower lip today. We will follow-up with the results of this.     Follow-up: No follow-ups on file.         Scribe Disclosure:  ILisa, am serving as a scribe to document services personally performed by Jamie Nava MD at this visit, based upon the provider's statements to me. All documentation has been reviewed by the aforementioned provider prior to being entered into the official medical record.

## 2022-02-23 LAB
PATH REPORT.COMMENTS IMP SPEC: NORMAL
PATH REPORT.COMMENTS IMP SPEC: NORMAL
PATH REPORT.FINAL DX SPEC: NORMAL
PATH REPORT.GROSS SPEC: NORMAL
PATH REPORT.MICROSCOPIC SPEC OTHER STN: NORMAL
PATH REPORT.RELEVANT HX SPEC: NORMAL
PHOTO IMAGE: NORMAL

## 2022-03-24 ENCOUNTER — OFFICE VISIT (OUTPATIENT)
Dept: RHEUMATOLOGY | Facility: CLINIC | Age: 42
End: 2022-03-24
Payer: COMMERCIAL

## 2022-03-24 VITALS
WEIGHT: 191.6 LBS | DIASTOLIC BLOOD PRESSURE: 70 MMHG | SYSTOLIC BLOOD PRESSURE: 110 MMHG | HEART RATE: 64 BPM | BODY MASS INDEX: 26.72 KG/M2

## 2022-03-24 DIAGNOSIS — M79.7 FIBROMYALGIA: ICD-10-CM

## 2022-03-24 DIAGNOSIS — M47.816 LUMBAR SPONDYLOSIS: ICD-10-CM

## 2022-03-24 DIAGNOSIS — M25.50 POLYARTHRALGIA: Primary | ICD-10-CM

## 2022-03-24 DIAGNOSIS — R68.2 DRY MOUTH: ICD-10-CM

## 2022-03-24 PROCEDURE — 99204 OFFICE O/P NEW MOD 45 MIN: CPT | Performed by: INTERNAL MEDICINE

## 2022-03-24 RX ORDER — OXYCODONE HYDROCHLORIDE 5 MG/1
TABLET ORAL
COMMUNITY
Start: 2022-03-09

## 2022-03-24 NOTE — PROGRESS NOTES
"  This document was created using a software with less than 100% fidelity, at times resulting in unintended, even erroneous syntax and grammar.  The reader is advised to keep this under consideration while reviewing, interpreting this note.      Rheumatology Consult Note      Kevyn Stuart Stately     YOB: 1980 Age: 41 year old    Date of visit: 3/24/22    PCP: No Ref-Primary, Physician    Chief Complaint   Patient presents with:  Consult      Assessment and Plan     Kevyn was seen today for consult.    Diagnoses and all orders for this visit:    Polyarthralgia    Fibromyalgia    Lumbar spondylosis    Dry mouth    Other orders  -     Rheumatology Referral           This patient presents with longstanding complex musculoskeletal symptoms, sicca symptoms.  There are several aspects of her symptoms that were discussed today.  She is convinced that she has seronegative Sjogren's syndrome.  We discussed how various rheumatologic diagnosis particularly Sjogren's syndrome is arrived at.  In the absence of CLARI, SSA SSB autoantibodies, pathology of minor salivary gland with 0 score, the usual diagnostic measures remain unsatisfied.  Despite that it will be quite reasonable for her to treat her for symptomatic relief such as with secretagogues.  She was not happy with that suggestion.  She wanted to know if 1 diagnosis could explain multiplicity of her symptoms.       I have outlined to her that while she does have evidence of degenerative joint disease certainly in the axial system I am thankfully unable to find evidence of inflammatory joint disease or connective tissue disease such as rheumatoid arthritis despite her \"arthritis diagnosis\" 20 years ago.  We discussed how besides her examination and serologic findings and x-ray of the hands and feet (done at [facility] 2020) were quite reassuring in this regard.  I would not advise to consider taking immunomodulators for her current symptoms.      In the end she " "expressed significant frustration.  She felt that her visit was a waste of her time.  This was based on her impression that I have not told her \"anything new \".  The options that were discussed in terms of therapeutic intervention for sicca symptoms, irrespective of whether 1 can make a confident diagnosis of Sjogren's or not, was declined by the patient including secretagogues.  Other reassurance such as examination as well as x-ray of the hands as good as they appear after more than \"20 years of arthritis\" did not seem to allay her concerns.    We will be happy to continue further observations, recommendations, should she choose to follow-up here.         AYLA Dillon is a 41 year old female  is seen today for evaluation of polyarthralgia has widespread pain, question of if she has Sjogren's.  She noted that besides dryness of mouth and eyes her other concerns are muscle spasms, generalized myalgias, more than 20-year history of \"arthritis\" of her hands, wrists elbows, longstanding back pain bilateral knee pain.    She reports her symptoms began when she was in her 20s.  She has seen a variety of physicians in various specialities.  She remembers being told that she has \"arthritis\" of her hands.  She has tried a variety of nonsteroidals.  She reports no swelling in the MCP areas but feels as if her PIPs and DIPs might be swollen.  She has noted pain in her back by which she means a lumbosacral area.  At this is the site where she has \"6 lumbar surgeries, it would appear discectomy and fusion and is due to undergo the seventh surgery in near future.  She was evaluated rheumatology at [facility], where the diagnosis of rheumatology was made, she feels that that appointment was an unsatisfactory interaction from her perspective.  During that visit her extensive evaluation revealed no signals of serologic autoimmunity from a rheumatologic perspective.  She noted dryness of mouth and eyes.  She does not " "report nocturnal waking because of the dry symptoms.  She carries \"3 different\" eyedrops.  She does not recall having had punctal plugs.  Her dentist has been concerned that she has significant dental issues includin recurrent fractured teeth, she has tried a variety of over-the-counter measures various Biotene products without help.  It does not sound as if she has had secretagogues.  She does not recall the name such as XyliMelts over-the-counter.      In 2021 she was evaluated at [facility] neurology clinic.  An excerpt from one of the visits as noted below.  She is under the impression that given the combination of dryness of mouth, which she intermittently noted was \"the least of my problems\", a marked small fiber neuropathy represented a seronegative Sjogren's syndrome.  She went on to have a -11 gland biopsy.  This was done on 2/22/2022.  The focus score was noted to be 0.  She remains convinced that the Sjogren's explains her symptoms the best that she has so far been informed.    She has reported no rash, photosensitivity, mouth ulcers.  She reports no history of DVT or PE.  No history of seizure disorder.  She does not have features suggestive of Raynaud's.  She describes recurrent sinus infections which have been deemed to be secondary to \"dry symptoms\".  Some of her main concern she noted pertaining to generalized pain, muscle spasms, she follows up at a a pain clinic where she gets narcotics and muscle relaxer that she takes 3 times daily.  She is frustrated that her ability to perform her work has declined fairly rapidly over the past 4 to 5 years.  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    She reports no family history of lupus Sjogren's rheumatoid arthritis psoriasis ulcerative colitis or Crohn's disease.  She was evaluated for genetic component for her neuropathy given cramping sensation and tingling in her mom and evidently in her son.         Following is the excerpt from  previous " visit documentations:   I had the pleasure to see Ms. Kevyn Dillon at the [facility] in followup.  As you know, she is a 41-year-old woman with intractable chronic muscle cramps, which remained unexplained to date.  She has a positive family history of the same problem.  She had extensive serological workup in our institution, which was negative.  She had an EMG that was negative and a skin biopsy at [facility] , interestingly, in 04/2021 that showed no evidence of small fiber neuropathy.  The epidermal nerve fiber density from the skin of the foot and the calf was normal. However, when I met her in Clinic for a complete exam for the first time on 08/16/2021, I noted that she had absent ankle jerks, reduced patellar jerks and clearly reduced distal sensation in her feet, suggestive of neuropathy.  I ended up repeating a skin biopsy to [facility] and the result came back markedly abnormal on 09/28/2021 with epidermal nerve fiber density at the thigh of 2.75 per mm with lower limit of normal of 8.3, and 3.89 at the distal calf with lower limit of normal of 4.9.  She had genetic testing for inherited causes of neuropathy on 09/14 that was negative ([facility] comprehensive panel).    Her symptoms are unchanged from the last visit.  She tells me she has very intense dry eyes and dry mouth.  Again, she had negative or normal CBC, TSH, B12, sed rate, CRP, CLARI, rheumatoid factor, CCP antibodies, CK, comprehensive metabolic panel, Lyme serology, serum immunofixation, ferritin, hepatitis C and hemoglobin A1c.     07/22/2021     EXAM: CT ABDOMEN PELVIS WITH IV CONTRAST     COMPARISON: 6/12/2017     Postsurgical changes with posterior L4-L5 fusion and L5-S1 discectomy with   anterior fusion. Associated beam hardening artifact. No acute osseous   abnormality or pneumothorax. Neurostimulator device extends into the lower   thoracic levels.     Degenerative disc disease at L2-3 and L4-5.      Discectomy and fusion at L5-S1. Likely postoperative enhancement in   the L5-S1 disc space.       Active Problem List   There is no problem list on file for this patient.    Past Medical History   No past medical history on file.  Past Surgical History   No past surgical history on file.  Allergy     Allergies   Allergen Reactions     Codeine Hives and Rash     Tolerates oxycodone       Cyclobenzaprine Hives and Rash     In mouth       Liquid Adhesive Rash     Adhesive     Fentanyl Hives     Noted in Transfer Records- on 12/16/10  Only allergic to the ADHESIVE part of Fentanyl patch     Penicillins      Other reaction(s): GI intolerance     Current Medication List     Current Outpatient Medications   Medication Sig     acetaminophen (TYLENOL) 500 MG tablet Take 1,000 mg by mouth     baclofen (LIORESAL) 10 MG tablet 20 mg 3 times daily      cholecalciferol 25 MCG (1000 UT) TABS Take 50 mcg by mouth     diclofenac (VOLTAREN) 1 % topical gel Apply 2 g topically     fexofenadine (ALLEGRA) 180 MG tablet Take 180 mg by mouth     fluconazole (DIFLUCAN) 150 MG tablet      fluticasone (FLONASE) 50 MCG/ACT nasal spray Spray 2 sprays in nostril     gabapentin (NEURONTIN) 300 MG capsule Take 900 mg by mouth 3 times daily      levothyroxine (SYNTHROID/LEVOTHROID) 25 MCG tablet TAKE 1 TABLET(25 MCG) BY MOUTH DAILY     lidocaine (LIDODERM) 5 % patch Place 1 patch onto the skin     oxyCODONE (ROXICODONE) 5 MG tablet TAKE 1 TABLET BY MOUTH EVERY 4 TO 6 HOURS AS NEEDED FOR CHRONIC PAIN. MAX 3.5 PER DAY     triamcinolone (KENALOG) 0.1 % external cream APPLY EXTERNALLY TO THE AFFECTED AREA 1 TO 2 TIMES DAILY AS NEEDED. AVOID FACE AND GROIN     valACYclovir (VALTREX) 1000 mg tablet TAKE 2 TABLETS(2000 MG) BY MOUTH TWICE DAILY AS NEEDED FOR COLD SORES     HYDROcodone-acetaminophen (NORCO) 5-325 MG tablet TAKE 1 TABLET BY MOUTH EVERY 6 HOURS AS NEEDED FOR PAIN OR CHRONIC PAIN. MAX 4 PER DAY     naproxen sodium (ANAPROX) 220 MG tablet  Take 440 mg by mouth     omeprazole (PRILOSEC) 40 MG DR capsule Take 40 mg by mouth     ondansetron (ZOFRAN-ODT) 4 MG ODT tab      traMADol (ULTRAM) 50 MG tablet 50 mg     No current facility-administered medications for this visit.            Family History   No family history on file.      Physical Exam     COMPREHENSIVE EXAMINATION:  Vitals:    03/24/22 1147   BP: 110/70   Pulse: 64   Weight: 86.9 kg (191 lb 9.6 oz)     A well appearing alert oriented female. Vital data as noted above. Her eyes examined for inflammation/scleromalacia. ENT examined for oral mucositis, moisture, thrush, nasal deformity, external ear redness, deformity. Her neck is examined for suppleness and lymphadenopathy.  Cardiopulmonary examination without dyspnea at rest, use of accessory muscles of breathing, wheezing, edema, peripheral or central cyanosis.  Abdomen examined for softness, tenderness, obvious organomegaly.  Skin examined for heliotrope, malar area eruption, lupus pernio, periungual erythema, sclerodactyly, papules, erythema nodosum, purpura, nail pitting, onycholysis, and obvious psoriasis lesion. Neurological examination done for alertness, speech, facial symmetry,  tone and power in upper and lower extremities, and gait. The joint examination is performed for swelling, tenderness, warmth, erythema, and range of motion in the following joints: DIPs, PIPs, MCPs, wrists, first CMC's, elbows, shoulders, hips, knees, ankles, feet; spine for range of motion and paraspinal muscles for tenderness. The salient normal / abnormal findings are appended.  She does not have synovitis in palpable joints.  She does not have dactylitis of digits.  She has tenderness in the proximal upper and distal upper extremities as well as proximal and distal lower extremity musculature.  She is tender across trapezius paraspinal region.  She does not have Maller eruption.  She does not have heliotrope or Gottron's.  There is no sclerodactyly,  periungual erythema.  She has dry oral mucosa with minimal salivary pool.  There is no oral thrush.    Labs / Imaging (select studies)     No results found for: PPDINDURATIO, PPDREDNESS, TBGOLT, RHF, CCPABG, URIC, [id number], [id number], ANTIDNA, ANTIDNAINT, CARIA, [id number], [id number], ENASM, ENASCL, JO1, ENASSA, ENASSB, CENTA, [id number], [id number], [id number]   Hemoglobin   Date Value Ref Range Status   03/12/2021 14.3 11.7 - 15.7 g/dL Final          Immunization History     Immunization History   Administered Date(s) Administered     COVID-19,PF,Moderna 04/15/2021, 05/13/2021

## 2022-05-22 ENCOUNTER — HEALTH MAINTENANCE LETTER (OUTPATIENT)
Age: 42
End: 2022-05-22

## 2022-09-25 ENCOUNTER — HEALTH MAINTENANCE LETTER (OUTPATIENT)
Age: 42
End: 2022-09-25

## 2023-06-04 ENCOUNTER — HEALTH MAINTENANCE LETTER (OUTPATIENT)
Age: 43
End: 2023-06-04

## 2024-03-02 ENCOUNTER — HEALTH MAINTENANCE LETTER (OUTPATIENT)
Age: 44
End: 2024-03-02

## 2024-07-20 ENCOUNTER — HEALTH MAINTENANCE LETTER (OUTPATIENT)
Age: 44
End: 2024-07-20

## 2024-08-22 ENCOUNTER — TRANSFERRED RECORDS (OUTPATIENT)
Dept: HEALTH INFORMATION MANAGEMENT | Facility: CLINIC | Age: 44
End: 2024-08-22
Payer: MEDICARE

## 2024-08-23 ENCOUNTER — MEDICAL CORRESPONDENCE (OUTPATIENT)
Dept: HEALTH INFORMATION MANAGEMENT | Facility: CLINIC | Age: 44
End: 2024-08-23
Payer: MEDICARE

## 2024-08-23 ENCOUNTER — TRANSFERRED RECORDS (OUTPATIENT)
Dept: HEALTH INFORMATION MANAGEMENT | Facility: CLINIC | Age: 44
End: 2024-08-23
Payer: MEDICARE

## 2024-09-10 ENCOUNTER — TRANSCRIBE ORDERS (OUTPATIENT)
Dept: OTHER | Age: 44
End: 2024-09-10

## 2024-09-10 DIAGNOSIS — M53.3 CHRONIC LEFT SI JOINT PAIN: Primary | ICD-10-CM

## 2024-09-10 DIAGNOSIS — G89.29 CHRONIC LEFT SI JOINT PAIN: Primary | ICD-10-CM

## 2024-09-13 DIAGNOSIS — M53.3 SACROILIAC JOINT PAIN: Primary | ICD-10-CM

## 2024-10-01 NOTE — TELEPHONE ENCOUNTER
Imaging DISC Received  October 3, 2024 10:41 AM ABT   Action: Imaging disc from [facility] received and uploaded to PACS.    23: LT Fusion     Action 2024 12:02 PM MT   Action Taken Called patient for more information, patient states that she has had  surgery at [facility] and [place] also. Patient gave [facility] for CE authorization, but [facility] was unable to locate a patient.   Called [facility] Query Assistance x3 , need a CE ID# for the patient, left a voicemail for their HIM team to call me back. Or an MARIYA is needed for the older records.  Called [facility] Radiology, they do not make the imaging disks, [facility] should be able to make the disk for them.  Sent a request for imaging from [place], [facility], and [facility].  [facility] image Disk Trackin.  [facility] radiology called back and wanted a shipping label because they do not push imaging to us.     Action 2024 1:48 PM MT   Action Taken Called [facility] for CE Query Assistance, [phone], need CE ID # to pull in CE records, no answer. Sent a request to [facility] for operative records. Sent a request for historical records at [facility].     Action 2024 3:48 PM MT   Action Taken Called [facility] CE Query Assistance, left a detailed voicemail for a callback.     Action 2024 4:01 PM MT   Action Taken [facility] called back and gave the CE ID. Chart updated.     DIAGNOSIS: SI Joint Revision Consult   APPOINTMENT DATE: 10/03/2024   NOTES STATUS DETAILS   OFFICE NOTE from referring provider [facility]   OFFICE NOTE from other specialist [facility] Physical Therapy   OPERATIVE REPORT  2023 - (Care Everywhere)  Left minimally invasive sacroiliac fusion with three IFuse devices; size 60 mm, size 50 mm, and size 45 mm.  DBX putty for left minimally invasive sacroiliac fusion.    2019 - (Care Everywhere)  Direct lateral interbody fusion of  L4-L5 / Right L4-L5 foraminotomy with decompression of the L4 root / Posterior spinal fusion of L4-L5.    12/27/2015 - (Care Everywhere) Anterior retroperitoneal approach lumbar 5/sacral 1 discectomy bank bone fusion instrumentation biomechanical device.    (PENDING)  02/17/2018 - [facility] - Spinal Cord Stimulator    Discectomy - June 2009 @ [facility]   EMG (for Spine)  Internal:  09/28/2021    [facility]:  10/27/2024   LABS     INJECTIONS DONE IN RADIOLOGY PACS [facility]:  08/22/2024, 02/10/2022, 10/14/2021 - Left SI Injection w/ Bupivacaine only.  11/10/2020 - Bilateral SI Joint Steroid Injection  09/11/2018 - Bilateral MBB    [place]:   04/28/2017 - L Spine   MRI PACS [place]:   08/27/2024 - C Spine  01/16/2024, 01/11/2023, 10/02/2017, 11/28/2016, 11/17/2016 - L Spine  01/11/2023 - Pelvis    [facility]:  04/18/2018 - L Spine    [facility]:   04/18/2016 - L Spine   NUCLEAR MED BONE SCAN PACS [place]:   08/16/2023 - Hip/Pelvis   CT SCAN PACS [place]:   09/06/2024- Chest  06/29/2023 - Pelvis  07/22/2021, 06/20/2017 - Abd/Pel  01/12/2017 - L Spine  01/12/2017 - C Spine    [place]:  07/26/2018- L Spine    [place]:   03/07/2016 - Abd/Pel  03/07/2016 - C Spine  02/20/2016 - L Spine     XRAYS (IMAGES & REPORTS) PACS [facility]:  02/16/2023 - C-ARM    [facility]:   10/25/2018 - L Spine Discography    [place]:   12/09/2016 - L Spine    [facility]:   03/24/2016 - L Spine

## 2024-10-03 ENCOUNTER — PRE VISIT (OUTPATIENT)
Dept: ORTHOPEDICS | Facility: CLINIC | Age: 44
End: 2024-10-03

## 2024-10-03 DIAGNOSIS — M53.3 SI (SACROILIAC) JOINT DYSFUNCTION: Primary | ICD-10-CM

## 2024-10-26 ENCOUNTER — HEALTH MAINTENANCE LETTER (OUTPATIENT)
Age: 44
End: 2024-10-26

## 2024-10-31 ENCOUNTER — ANCILLARY PROCEDURE (OUTPATIENT)
Dept: GENERAL RADIOLOGY | Facility: CLINIC | Age: 44
End: 2024-10-31
Attending: ORTHOPAEDIC SURGERY
Payer: MEDICARE

## 2024-10-31 ENCOUNTER — OFFICE VISIT (OUTPATIENT)
Dept: ORTHOPEDICS | Facility: CLINIC | Age: 44
End: 2024-10-31
Payer: MEDICARE

## 2024-10-31 VITALS — WEIGHT: 161 LBS | HEIGHT: 70 IN | BODY MASS INDEX: 23.05 KG/M2

## 2024-10-31 DIAGNOSIS — M54.2 NECK PAIN: Primary | ICD-10-CM

## 2024-10-31 DIAGNOSIS — M53.3 SI (SACROILIAC) JOINT DYSFUNCTION: ICD-10-CM

## 2024-10-31 DIAGNOSIS — M50.122 CERVICAL DISC DISORDER AT C5-C6 LEVEL WITH RADICULOPATHY: ICD-10-CM

## 2024-10-31 DIAGNOSIS — M54.2 CERVICAL PAIN: Primary | ICD-10-CM

## 2024-10-31 DIAGNOSIS — M54.2 NECK PAIN: ICD-10-CM

## 2024-10-31 DIAGNOSIS — M53.3 CHRONIC LEFT SI JOINT PAIN: ICD-10-CM

## 2024-10-31 DIAGNOSIS — G89.29 CHRONIC LEFT SI JOINT PAIN: ICD-10-CM

## 2024-10-31 PROCEDURE — 99204 OFFICE O/P NEW MOD 45 MIN: CPT | Performed by: ORTHOPAEDIC SURGERY

## 2024-10-31 PROCEDURE — 72082 X-RAY EXAM ENTIRE SPI 2/3 VW: CPT | Performed by: STUDENT IN AN ORGANIZED HEALTH CARE EDUCATION/TRAINING PROGRAM

## 2024-10-31 PROCEDURE — 72040 X-RAY EXAM NECK SPINE 2-3 VW: CPT | Performed by: RADIOLOGY

## 2024-10-31 PROCEDURE — 77073 BONE LENGTH STUDIES: CPT | Performed by: STUDENT IN AN ORGANIZED HEALTH CARE EDUCATION/TRAINING PROGRAM

## 2024-10-31 RX ORDER — FEXOFENADINE HCL AND PSEUDOEPHEDRINE HCI 60; 120 MG/1; MG/1
1 TABLET, EXTENDED RELEASE ORAL 2 TIMES DAILY
COMMUNITY

## 2024-10-31 RX ORDER — VARENICLINE TARTRATE 0.5 (11)-1
0.5 KIT ORAL 2 TIMES DAILY
COMMUNITY
Start: 2024-10-10

## 2024-10-31 RX ORDER — BUPROPION HYDROCHLORIDE 150 MG/1
150 TABLET, EXTENDED RELEASE ORAL DAILY
COMMUNITY
Start: 2024-09-10

## 2024-10-31 RX ORDER — CEFDINIR 300 MG/1
300 CAPSULE ORAL 2 TIMES DAILY
COMMUNITY
Start: 2024-03-13

## 2024-10-31 RX ORDER — UREA 10 %
500 LOTION (ML) TOPICAL DAILY
COMMUNITY

## 2024-10-31 RX ORDER — OXYCODONE AND ACETAMINOPHEN 10; 325 MG/1; MG/1
1 TABLET ORAL EVERY 6 HOURS PRN
COMMUNITY

## 2024-10-31 RX ORDER — AZATHIOPRINE 50 MG/1
50 TABLET ORAL DAILY
COMMUNITY
Start: 2024-10-06

## 2024-10-31 RX ORDER — METHYLPREDNISOLONE 4 MG/1
4 TABLET ORAL DAILY
COMMUNITY

## 2024-10-31 RX ORDER — DIPHENHYDRAMINE HCL 50 MG
50 CAPSULE ORAL EVERY 4 HOURS PRN
COMMUNITY

## 2024-10-31 RX ORDER — FAMOTIDINE 40 MG/1
40 TABLET, FILM COATED ORAL 2 TIMES DAILY
COMMUNITY
Start: 2024-05-22 | End: 2025-05-22

## 2024-10-31 NOTE — PROGRESS NOTES
0In-Person Visit    Spine Surgical Hx:   12/27/2015 - anterior retroperitoneal approach, L5-S1 discectomy bank bone fusion instrumentation (Dr. Duong)  4/17/2019 - direct lateral body fusion of L4-L5, right L4-L5 foraminotomy with decompression of L4 root, posterior spinal fusion L4-L5 (Dr. Maninder Whipple)  2/16/2023 - left MIS SIJ fusion with 3 iFuse devices (Dr. Maninder Whipple)    REASON FOR CONSULTATION: Consult (XR 8/23 + MRI 1/16; Prior SI joint fusion 2022, Referral to Dr. Marin or Dr. Crocker for left SI fusion revision from [facility]/Chronic left SI joint pain/ /)     REFERRING PHYSICIAN: Maninder Whipple  PCP:No Ref-Primary, Physician    History of Present Illness:  44 year old female, referred by Dr. Maninder Whipple for possible revision of Left SIJ fusion as well as at the request of the patient for a second opinion.    Today the patient presents with concerns regarding the neck, back, arms, and legs.  She feels that the neck pain is the most severe pain followed by back pain, followed by leg pain.  She notes that after her most recent surgery her symptoms never improved and her pain never improved after surgery.  She felt that her surgeon denied that the surgery failed and she felt that the surgeon blamed her smoking for the reason why it failed.    She feels that her neck symptoms are the most bothersome, the symptoms started in July after waking up with a stiff neck.  She endorses bilateral arm pain and weakness of the arms and neck.  The arm pain radiates down the posterior shoulder, the lateral arm to the posterior elbow.  She reports a prior EMG which was normal except for mild bilateral carpal tunnel.  She endorses loss of dexterity since July as well as more balance issues.  She denies any bowel incontinence continence but does report urge incontinence that has been worse since July.    Her back symptoms have been persistent for over 15 years, she endorses  bilateral radiating leg pain down the posterior aspect of the thigh to the lateral calf into the dorsum of the foot with the right side being worse than the left.  Symptoms are exacerbated by laying flat.  She also endorses bilateral weakness of the lower extremities    Neck 80% Back 10%, Leg 10%,  Right > Left  Worse: Lying flat physical therapy  Better: Medications    Previous treatment:   Physical therapy  Percocet 10 mg 5 times a day (2 years of medication, managed by [facility])  Baclofen  Gabapentin 900 mg 3 times a day    Previous Injections:  8/23/2024 -  left SI joint injection which provided 50% relief for 3 hours    Oswestry (MYRA) Questionnaire        10/31/2024    10:35 AM   OSWESTRY DISABILITY INDEX   Count 9    Sum 29    Oswestry Score (%) 64.44 %        Patient-reported      MYRA 10/31/24 64.44%      Visual Analog Pain Scale  Back Pain Scale 0-10: 6 (low back pain)  Right leg pain: 3 (pain from hip to foot in back of leg)  Left leg pain: 2 (pain from hip to foot)  Neck Pain Scale 0-10: 7  Right arm pain: 0  Left arm pain: 0    PROMIS-10 Scores  Global Mental Health Score: (Patient-Rptd) (P) 9  Global Physical Health Score: (Patient-Rptd) (P) 7  PROMIS TOTAL - SUBSCORES: (Patient-Rptd) (P) 16    ROS:  A 12-point review of systems was completed and is negative except for otherwise noted above in the history of present illness.    Med Hx:  No past medical history on file.    Surg Hx:  No past surgical history on file.    Allergies:  Allergies   Allergen Reactions    Codeine Hives and Rash     Tolerates oxycodone      Cyclobenzaprine Hives and Rash     In mouth      Liquid Adhesive Rash     Adhesive    Fentanyl Hives     Noted in Transfer Records- on 12/16/10  Only allergic to the ADHESIVE part of Fentanyl patch    Penicillins      Other reaction(s): GI intolerance       Meds:  Current Outpatient Medications   Medication Sig Dispense Refill    acetaminophen (TYLENOL) 500 MG tablet Take 1,000  mg by mouth      azaTHIOprine (IMURAN) 50 MG tablet Take 50 mg by mouth daily.      baclofen (LIORESAL) 10 MG tablet 20 mg 3 times daily       buPROPion (WELLBUTRIN SR) 150 MG 12 hr tablet Take 150 mg by mouth daily.      cefdinir (OMNICEF) 300 MG capsule Take 300 mg by mouth 2 times daily.      cholecalciferol 25 MCG (1000 UT) TABS Take 50 mcg by mouth      cyanocobalamin (VITAMIN B-12) 500 MCG tablet Take 500 mcg by mouth daily.      diphenhydrAMINE (BENADRYL) 50 MG capsule Take 50 mg by mouth every 4 hours as needed.      famotidine (PEPCID) 40 MG tablet Take 40 mg by mouth 2 times daily.      fexofenadine (ALLEGRA) 180 MG tablet Take 180 mg by mouth      fexofenadine-pseudoePHEDrine (ALLEGRA-D)  MG 12 hr tablet Take 1 tablet by mouth 2 times daily.      fluconazole (DIFLUCAN) 150 MG tablet       fluticasone (FLONASE) 50 MCG/ACT nasal spray Spray 2 sprays in nostril      gabapentin (NEURONTIN) 300 MG capsule Take 900 mg by mouth 3 times daily       levothyroxine (SYNTHROID/LEVOTHROID) 25 MCG tablet TAKE 1 TABLET(25 MCG) BY MOUTH DAILY      lidocaine (LIDODERM) 5 % patch Place 1 patch onto the skin      methylPREDNISolone (MEDROL) 4 MG tablet Take 4 mg by mouth daily.      oxyCODONE (ROXICODONE) 5 MG tablet TAKE 1 TABLET BY MOUTH EVERY 4 TO 6 HOURS AS NEEDED FOR CHRONIC PAIN. MAX 3.5 PER DAY      oxyCODONE-acetaminophen (PERCOCET)  MG per tablet Take 1 tablet by mouth every 6 hours as needed for breakthrough pain.      valACYclovir (VALTREX) 1000 mg tablet TAKE 2 TABLETS(2000 MG) BY MOUTH TWICE DAILY AS NEEDED FOR COLD SORES      varenicline (CHANTIX RON) 0.5 MG X 11 & 1 MG X 42 tablet Take 0.5 mg by mouth 2 times daily.      diclofenac (VOLTAREN) 1 % topical gel Apply 2 g topically      HYDROcodone-acetaminophen (NORCO) 5-325 MG tablet TAKE 1 TABLET BY MOUTH EVERY 6 HOURS AS NEEDED FOR PAIN OR CHRONIC PAIN. MAX 4 PER DAY      naproxen sodium (ANAPROX) 220 MG tablet Take 440 mg by mouth      omeprazole  "(PRILOSEC) 40 MG DR capsule Take 40 mg by mouth      ondansetron (ZOFRAN-ODT) 4 MG ODT tab       traMADol (ULTRAM) 50 MG tablet 50 mg      triamcinolone (KENALOG) 0.1 % external cream APPLY EXTERNALLY TO THE AFFECTED AREA 1 TO 2 TIMES DAILY AS NEEDED. AVOID FACE AND GROIN       No current facility-administered medications for this visit.       Fam Hx:  No family history on file.    P/S Hx:  Social History     Tobacco Use    Smoking status: Every Day    Smokeless tobacco: Never   Substance Use Topics    Alcohol use: Not on file       Physical Exam:  Very pleasant, healthy appearing, alert, oriented x 3, cooperative.  Normal mood and affect.  Not in cardiorespiratory distress.  Ht 1.778 m (5' 10\")   Wt 73 kg (161 lb)   BMI 23.10 kg/m    Normal upright posture.    Normal gait without assistive device.  No antalgia / imbalance.    No gross spinal deformity, no skin lesions or surgical scars.  Tenderness: (+) midline, (+) paraspinal over the C7 prominence.      Cervical spine:    Appearance -no gross step-offs, kyphosis.    Motor -     C5: Deltoids R 5/5 and L 5/5 strength    C6: Biceps R 5/5 and L 5/5 strength     C7: Triceps R 5/5 and L 5/5 strength     C8:  R 5/5 and L 5/5 strength     T1: Dorsal interossei R 4/5 and L 4/5 strength        Sensation: Sensation intact in all distributions the exception of decrease sensation in the right radial and median nerve distributions.      Special Tests -      Lhermitte's Test - Negative     Spurling's Test - Negative      Khalil's Test - Negative       Lumbar Spine:    Appearance - No gross stepoffs or deformities    Motor -     L2-3: Hip flexion 5/5 R and 5/5 L strength          L3/4:  Knee extension R 5/5 and L 5/5 strength         L4/5:  Foot dorsiflexion R 5/5 L 5/5 and       EHL dorsiflexion R 4/5 L 5/5 strength         S1:  Plantarflexion/Peroneal Muscles  R 5/5 and L 5/5 strength    Sensation: Intact sensation in all distributions with the exception of decrease " sensation in the right L5 distribution    Neurologic:      REFLEXES Left Right   Biceps 1+ 1+   Triceps 1+ 1+   Brachioradialis 1+ 1+   Patella 1+ 1+   Ankle jerk 1+ 1+   Babinski No upgoing great toe No upgoing great toe   Clonus 0 beats 0 beats     Hip Exam:  No pain with hip log roll and no tenderness over the greater trochanters.    Alignment:  Patient stands with a neutral standing sagittal balance.    Imagin view cervical spine x-ray obtained 10/31/2024 independently reviewed which demonstrates appropriate cervical vertebral body alignment without evidence of anterior or retrolisthesis, displays narrowing, or facet arthropathy.    EOS x-ray obtained 10/31/2024 independently reviewed which demonstrates prior lateral lumbar interbody fusion at the L4-L5 level as well as anterior interbody fusion at the L5-S1 level.  Implants appear stable without evidence of lucency.  Disc heights appear preserved without evidence of adjacent segment disease.    MRI lumbar spine obtained 2024 independently reviewed which demonstrates patent neuroforamina at all levels without evidence of central stenosis.  No evidence of adjacent level disease.    CT pelvis obtained 2023 independently reviewed which demonstrates well-healed L4-L5 and L5-S1 fusions without evidence of lucency, subsidence, or collapse.    Assessment:    Mild C5-6, C6-C7 cervical disc herniations without central canal stenosis or neuroforaminal stenosis  History of failed left MIS SI joint fusion    We reviewed the imaging and clinical course thus far, we explained that given the patient has a multitude of symptoms involving the neck, arms, back, and legs and her MIS SI joint fusion surgeries failed to provide adequate relief even for a short period of time that it is questionable whether her symptoms are related to SI joint pathology.  We also reviewed the imaging commenting that the cervical spine disc herniations are fairly mild and would not  explain her neck or arm symptoms.  That said, she has done physical therapy without improvement and therefore a corticosteroid injection of the right C5-C6 nerve root may provide benefit and it may provide diagnostic information.  The hip pain may be related to pseudoarthrosis but to better evaluate this we would have to obtain a CT of the pelvis.  We also do not have a recent cervical spine CT to evaluate for facet joint arthropathy and arthritis therefore this will also be obtained.  We also took the time to  the patient on the importance of smoking cessation as if any surgery were indicated in the future she would have to be free of all nicotine products.  Will plan to have the patient follow-up virtually after undergoing the injection to evaluate for symptomatic improvement.    Plan:  - Smoking cessation  - CT pelvis and cervical spine prior to follow-up  - Right C5-C6 nerve root injection with corticosteroid  - Follow-up virtually after undergoing injection to evaluate symptoms    Ample time and opportunity was provided to the patient to ask questions, all of which were answered to her satisfaction prior to the conclusion of her clinic visit today.    Assessment and plan discussed with Dr. Crocker who is in agreement with the above.    Voice-to-text dictation software was utilized in the creation of this note therefore there may be unintended word substitutions, although errors are generally corrected real-time, there is the potential for a rare error to be present in the completed chart. Please do not hesitate to reach out for clarification.    Stef Golden MD  Orthopaedic Surgery Resident  [facility]  10/31/2024    Attestation:  I (Dr. Nir Crocker - Spine Surgeon) have personally evaluated patient with PGY-4 Chantel, and agree with findings and plan outlined in the note, which I also edited.  I discussed at length with the patient/family, explained the nature of spinal  condition, and formulated workup and/or treatment plan together.  All questions were answered to the best of my ability and to patient's apparent satisfaction.     >45 minutes spent on the date of the encounter doing chart review/review of outside records/review of test results/interpretation of tests/patient visit/documentation/discussion with other provider(s)/discussion with patient and family.    Nir Crocker MD    Orthopaedic Spine Surgery  Dept Orthopaedic Surgery, [facility]  [phone] office, [phone] pager  [email or web address]

## 2024-10-31 NOTE — LETTER
10/31/2024      Kevyn Dillon  [address] [place] [address]      Dear Colleague,    Thank you for referring your patient, Kevyn Dillon, to the [facility] [place]. Please see a copy of my visit note below.    0In-Person Visit    Spine Surgical Hx:   12/27/2015 - anterior retroperitoneal approach, L5-S1 discectomy bank bone fusion instrumentation (Dr. Duong)  4/17/2019 - direct lateral body fusion of L4-L5, right L4-L5 foraminotomy with decompression of L4 root, posterior spinal fusion L4-L5 (Dr. Maninder Whipple)  2/16/2023 - left MIS SIJ fusion with 3 iFuse devices (Dr. Maninder Whipple)    REASON FOR CONSULTATION: Consult (XR 8/23 + MRI 1/16; Prior SI joint fusion 2022, Referral to Dr. Marin or Dr. Crocker for left SI fusion revision from [facility]/Chronic left SI joint pain/ /)     REFERRING PHYSICIAN: Maninder Whipple  PCP:No Ref-Primary, Physician    History of Present Illness:  44 year old female, referred by Dr. Maninder Whipple for possible revision of Left SIJ fusion as well as at the request of the patient for a second opinion.    Today the patient presents with concerns regarding the neck, back, arms, and legs.  She feels that the neck pain is the most severe pain followed by back pain, followed by leg pain.  She notes that after her most recent surgery her symptoms never improved and her pain never improved after surgery.  She felt that her surgeon denied that the surgery failed and she felt that the surgeon blamed her smoking for the reason why it failed.    She feels that her neck symptoms are the most bothersome, the symptoms started in July after waking up with a stiff neck.  She endorses bilateral arm pain and weakness of the arms and neck.  The arm pain radiates down the posterior shoulder, the lateral arm to the posterior elbow.  She reports a prior EMG which was normal except for mild bilateral carpal tunnel.  She  endorses loss of dexterity since July as well as more balance issues.  She denies any bowel incontinence continence but does report urge incontinence that has been worse since July.    Her back symptoms have been persistent for over 15 years, she endorses bilateral radiating leg pain down the posterior aspect of the thigh to the lateral calf into the dorsum of the foot with the right side being worse than the left.  Symptoms are exacerbated by laying flat.  She also endorses bilateral weakness of the lower extremities    Neck 80% Back 10%, Leg 10%,  Right > Left  Worse: Lying flat physical therapy  Better: Medications    Previous treatment:   Physical therapy  Percocet 10 mg 5 times a day (2 years of medication, managed by [facility] pain clinic)  Baclofen  Gabapentin 900 mg 3 times a day    Previous Injections:  8/23/2024 -  left SI joint injection which provided 50% relief for 3 hours    Oswestry (MYRA) Questionnaire        10/31/2024    10:35 AM   OSWESTRY DISABILITY INDEX   Count 9    Sum 29    Oswestry Score (%) 64.44 %        Patient-reported      MYRA 10/31/24 64.44%      Visual Analog Pain Scale  Back Pain Scale 0-10: 6 (low back pain)  Right leg pain: 3 (pain from hip to foot in back of leg)  Left leg pain: 2 (pain from hip to foot)  Neck Pain Scale 0-10: 7  Right arm pain: 0  Left arm pain: 0    PROMIS-10 Scores  Global Mental Health Score: (Patient-Rptd) (P) 9  Global Physical Health Score: (Patient-Rptd) (P) 7  PROMIS TOTAL - SUBSCORES: (Patient-Rptd) (P) 16    ROS:  A 12-point review of systems was completed and is negative except for otherwise noted above in the history of present illness.    Med Hx:  No past medical history on file.    Surg Hx:  No past surgical history on file.    Allergies:  Allergies   Allergen Reactions     Codeine Hives and Rash     Tolerates oxycodone       Cyclobenzaprine Hives and Rash     In mouth       Liquid Adhesive Rash     Adhesive     Fentanyl Hives     Noted in Transfer  Records- on 12/16/10  Only allergic to the ADHESIVE part of Fentanyl patch     Penicillins      Other reaction(s): GI intolerance       Meds:  Current Outpatient Medications   Medication Sig Dispense Refill     acetaminophen (TYLENOL) 500 MG tablet Take 1,000 mg by mouth       azaTHIOprine (IMURAN) 50 MG tablet Take 50 mg by mouth daily.       baclofen (LIORESAL) 10 MG tablet 20 mg 3 times daily        buPROPion (WELLBUTRIN SR) 150 MG 12 hr tablet Take 150 mg by mouth daily.       cefdinir (OMNICEF) 300 MG capsule Take 300 mg by mouth 2 times daily.       cholecalciferol 25 MCG (1000 UT) TABS Take 50 mcg by mouth       cyanocobalamin (VITAMIN B-12) 500 MCG tablet Take 500 mcg by mouth daily.       diphenhydrAMINE (BENADRYL) 50 MG capsule Take 50 mg by mouth every 4 hours as needed.       famotidine (PEPCID) 40 MG tablet Take 40 mg by mouth 2 times daily.       fexofenadine (ALLEGRA) 180 MG tablet Take 180 mg by mouth       fexofenadine-pseudoePHEDrine (ALLEGRA-D)  MG 12 hr tablet Take 1 tablet by mouth 2 times daily.       fluconazole (DIFLUCAN) 150 MG tablet        fluticasone (FLONASE) 50 MCG/ACT nasal spray Spray 2 sprays in nostril       gabapentin (NEURONTIN) 300 MG capsule Take 900 mg by mouth 3 times daily        levothyroxine (SYNTHROID/LEVOTHROID) 25 MCG tablet TAKE 1 TABLET(25 MCG) BY MOUTH DAILY       lidocaine (LIDODERM) 5 % patch Place 1 patch onto the skin       methylPREDNISolone (MEDROL) 4 MG tablet Take 4 mg by mouth daily.       oxyCODONE (ROXICODONE) 5 MG tablet TAKE 1 TABLET BY MOUTH EVERY 4 TO 6 HOURS AS NEEDED FOR CHRONIC PAIN. MAX 3.5 PER DAY       oxyCODONE-acetaminophen (PERCOCET)  MG per tablet Take 1 tablet by mouth every 6 hours as needed for breakthrough pain.       valACYclovir (VALTREX) 1000 mg tablet TAKE 2 TABLETS(2000 MG) BY MOUTH TWICE DAILY AS NEEDED FOR COLD SORES       varenicline (CHANTIX RON) 0.5 MG X 11 & 1 MG X 42 tablet Take 0.5 mg by mouth 2 times daily.        "diclofenac (VOLTAREN) 1 % topical gel Apply 2 g topically       HYDROcodone-acetaminophen (NORCO) 5-325 MG tablet TAKE 1 TABLET BY MOUTH EVERY 6 HOURS AS NEEDED FOR PAIN OR CHRONIC PAIN. MAX 4 PER DAY       naproxen sodium (ANAPROX) 220 MG tablet Take 440 mg by mouth       omeprazole (PRILOSEC) 40 MG DR capsule Take 40 mg by mouth       ondansetron (ZOFRAN-ODT) 4 MG ODT tab        traMADol (ULTRAM) 50 MG tablet 50 mg       triamcinolone (KENALOG) 0.1 % external cream APPLY EXTERNALLY TO THE AFFECTED AREA 1 TO 2 TIMES DAILY AS NEEDED. AVOID FACE AND GROIN       No current facility-administered medications for this visit.       Fam Hx:  No family history on file.    P/S Hx:  Social History     Tobacco Use     Smoking status: Every Day     Smokeless tobacco: Never   Substance Use Topics     Alcohol use: Not on file       Physical Exam:  Very pleasant, healthy appearing, alert, oriented x 3, cooperative.  Normal mood and affect.  Not in cardiorespiratory distress.  Ht 1.778 m (5' 10\")   Wt 73 kg (161 lb)   BMI 23.10 kg/m    Normal upright posture.    Normal gait without assistive device.  No antalgia / imbalance.    No gross spinal deformity, no skin lesions or surgical scars.  Tenderness: (+) midline, (+) paraspinal over the C7 prominence.      Cervical spine:    Appearance -no gross step-offs, kyphosis.    Motor -     C5: Deltoids R 5/5 and L 5/5 strength    C6: Biceps R 5/5 and L 5/5 strength     C7: Triceps R 5/5 and L 5/5 strength     C8:  R 5/5 and L 5/5 strength     T1: Dorsal interossei R 4/5 and L 4/5 strength        Sensation: Sensation intact in all distributions the exception of decrease sensation in the right radial and median nerve distributions.      Special Tests -      Lhermitte's Test - Negative     Spurling's Test - Negative      Khalil's Test - Negative       Lumbar Spine:    Appearance - No gross stepoffs or deformities    Motor -     L2-3: Hip flexion 5/5 R and 5/5 L strength          L3/4:  " Knee extension R 5/5 and L 5/5 strength         L4/5:  Foot dorsiflexion R 5/5 L 5/5 and       EHL dorsiflexion R 4/5 L 5/5 strength         S1:  Plantarflexion/Peroneal Muscles  R 5/5 and L 5/5 strength    Sensation: Intact sensation in all distributions with the exception of decrease sensation in the right L5 distribution    Neurologic:      REFLEXES Left Right   Biceps 1+ 1+   Triceps 1+ 1+   Brachioradialis 1+ 1+   Patella 1+ 1+   Ankle jerk 1+ 1+   Babinski No upgoing great toe No upgoing great toe   Clonus 0 beats 0 beats     Hip Exam:  No pain with hip log roll and no tenderness over the greater trochanters.    Alignment:  Patient stands with a neutral standing sagittal balance.    Imagin view cervical spine x-ray obtained 10/31/2024 independently reviewed which demonstrates appropriate cervical vertebral body alignment without evidence of anterior or retrolisthesis, displays narrowing, or facet arthropathy.    EOS x-ray obtained 10/31/2024 independently reviewed which demonstrates prior lateral lumbar interbody fusion at the L4-L5 level as well as anterior interbody fusion at the L5-S1 level.  Implants appear stable without evidence of lucency.  Disc heights appear preserved without evidence of adjacent segment disease.    MRI lumbar spine obtained 2024 independently reviewed which demonstrates patent neuroforamina at all levels without evidence of central stenosis.  No evidence of adjacent level disease.    CT pelvis obtained 2023 independently reviewed which demonstrates well-healed L4-L5 and L5-S1 fusions without evidence of lucency, subsidence, or collapse.    Assessment:    Mild C5-6, C6-C7 cervical disc herniations without central canal stenosis or neuroforaminal stenosis  History of failed left MIS SI joint fusion    We reviewed the imaging and clinical course thus far, we explained that given the patient has a multitude of symptoms involving the neck, arms, back, and legs and her MIS  SI joint fusion surgeries failed to provide adequate relief even for a short period of time that it is questionable whether her symptoms are related to SI joint pathology.  We also reviewed the imaging commenting that the cervical spine disc herniations are fairly mild and would not explain her neck or arm symptoms.  That said, she has done physical therapy without improvement and therefore a corticosteroid injection of the right C5-C6 nerve root may provide benefit and it may provide diagnostic information.  The hip pain may be related to pseudoarthrosis but to better evaluate this we would have to obtain a CT of the pelvis.  We also do not have a recent cervical spine CT to evaluate for facet joint arthropathy and arthritis therefore this will also be obtained.  We also took the time to  the patient on the importance of smoking cessation as if any surgery were indicated in the future she would have to be free of all nicotine products.  Will plan to have the patient follow-up virtually after undergoing the injection to evaluate for symptomatic improvement.    Plan:  - Smoking cessation  - CT pelvis and cervical spine prior to follow-up  - Right C5-C6 nerve root injection with corticosteroid  - Follow-up virtually after undergoing injection to evaluate symptoms    Ample time and opportunity was provided to the patient to ask questions, all of which were answered to her satisfaction prior to the conclusion of her clinic visit today.    Assessment and plan discussed with Dr. Crocker who is in agreement with the above.    Voice-to-text dictation software was utilized in the creation of this note therefore there may be unintended word substitutions, although errors are generally corrected real-time, there is the potential for a rare error to be present in the completed chart. Please do not hesitate to reach out for clarification.    Stef Golden MD  Orthopaedic Surgery Resident  [facility]  [place]  10/31/2024    Attestation:  I (Dr. Nir Crocker - Spine Surgeon) have personally evaluated patient with PGY-4 Chantel, and agree with findings and plan outlined in the note, which I also edited.  I discussed at length with the patient/family, explained the nature of spinal condition, and formulated workup and/or treatment plan together.  All questions were answered to the best of my ability and to patient's apparent satisfaction.     >45 minutes spent on the date of the encounter doing chart review/review of outside records/review of test results/interpretation of tests/patient visit/documentation/discussion with other provider(s)/discussion with patient and family.    Nir Crocker MD    Orthopaedic Spine Surgery  Dept Orthopaedic Surgery, [facility]  [phone] office, [phone] pager  [email or web address]      Again, thank you for allowing me to participate in the care of your patient.        Sincerely,        Nir Crocker MD

## 2025-03-03 ENCOUNTER — ANCILLARY PROCEDURE (OUTPATIENT)
Dept: INTERVENTIONAL RADIOLOGY/VASCULAR | Facility: CLINIC | Age: 45
End: 2025-03-03
Payer: MEDICARE

## 2025-03-03 ENCOUNTER — ANCILLARY PROCEDURE (OUTPATIENT)
Dept: CT IMAGING | Facility: CLINIC | Age: 45
End: 2025-03-03
Payer: MEDICARE

## 2025-03-03 ENCOUNTER — TELEPHONE (OUTPATIENT)
Dept: ORTHOPEDICS | Facility: CLINIC | Age: 45
End: 2025-03-03

## 2025-03-03 VITALS
HEART RATE: 60 BPM | SYSTOLIC BLOOD PRESSURE: 112 MMHG | DIASTOLIC BLOOD PRESSURE: 60 MMHG | RESPIRATION RATE: 16 BRPM | OXYGEN SATURATION: 97 %

## 2025-03-03 DIAGNOSIS — M54.2 CERVICAL PAIN: ICD-10-CM

## 2025-03-03 DIAGNOSIS — M50.122 CERVICAL DISC DISORDER AT C5-C6 LEVEL WITH RADICULOPATHY: ICD-10-CM

## 2025-03-03 PROCEDURE — 72125 CT NECK SPINE W/O DYE: CPT | Mod: GC | Performed by: RADIOLOGY

## 2025-03-03 PROCEDURE — 64479 NJX AA&/STRD TFRM EPI C/T 1: CPT | Mod: RT | Performed by: RADIOLOGY

## 2025-03-03 PROCEDURE — 72192 CT PELVIS W/O DYE: CPT | Performed by: RADIOLOGY

## 2025-03-03 RX ORDER — LIDOCAINE HYDROCHLORIDE 10 MG/ML
5 INJECTION, SOLUTION EPIDURAL; INFILTRATION; INTRACAUDAL; PERINEURAL ONCE
Status: COMPLETED | OUTPATIENT
Start: 2025-03-03 | End: 2025-03-03

## 2025-03-03 RX ORDER — IOPAMIDOL 408 MG/ML
10 INJECTION, SOLUTION INTRATHECAL ONCE
Status: COMPLETED | OUTPATIENT
Start: 2025-03-03 | End: 2025-03-03

## 2025-03-03 RX ORDER — DEXAMETHASONE SODIUM PHOSPHATE 10 MG/ML
10 INJECTION, SOLUTION INTRAMUSCULAR; INTRAVENOUS ONCE
Status: COMPLETED | OUTPATIENT
Start: 2025-03-03 | End: 2025-03-03

## 2025-03-03 RX ADMIN — IOPAMIDOL 1 ML: 408 INJECTION, SOLUTION INTRATHECAL at 09:51

## 2025-03-03 RX ADMIN — LIDOCAINE HYDROCHLORIDE 5 ML: 10 INJECTION, SOLUTION EPIDURAL; INFILTRATION; INTRACAUDAL; PERINEURAL at 09:51

## 2025-03-03 RX ADMIN — DEXAMETHASONE SODIUM PHOSPHATE 10 MG: 10 INJECTION, SOLUTION INTRAMUSCULAR; INTRAVENOUS at 09:51

## 2025-03-03 NOTE — TELEPHONE ENCOUNTER
Other: Patient saw Dr Crocker on 10/31/2024 for possible spinal fusion revision, received nerve block in spine on 3/3/25. Instructed to schedule follow up with Dr. Crocker from nerve block but unable to schedule in person follow up until late May, patient does NOT want to do virtual visit. Is patient ok to be seen in May or should she be seen sooner      Could we send this information to you in [employer] or would you prefer to receive a phone call?:   Patient would prefer a phone call   Okay to leave a detailed message?: Yes at Cell number on file:    Telephone Information:   Mobile [phone]

## 2025-03-03 NOTE — DISCHARGE INSTRUCTIONS
Discharge Instructions for Cervical Injection/Nerve Block  Care of injection site:  If you have new numbness down your arms after the injection, this may last up to 4-6 hours, but should go away.   Over the next 24 to 48 hours, pain at the injection site may increase before it gets better.  For the next 48 hours, use ice packs for 15 minutes, 3-4 times a day for pain relief.  If you have a bandage, you may remove it the next morning      Do not submerge injection site in water for 24 hours (no baths. pools, hot tubs).  Showers are OK.            Relax today. Return to your regular activity tomorrow.  You may eat a normal diet.  Do not drive for 24 hours.         Medications  Restart blood thinning medicines tomorrow at your regular dose.  If you are restarting warfarin tomorrow, be sure to discuss a follow up plan with you anticoagulation clinic.  Continue to take all other medications as ordered by your provider.    Common side effects may include:  Increased blood sugar.  (If you are diabetic, watch your blood sugar closely. Call your doctor to help control your blood sugar)   Insomnia  Heartburn  Flushed face  Water retention  Increased appetite    Important Information:  The steroid should help reduce swelling and pain. This may take from 3-14 days.   Follow up with the provider that ordered this injection in 3-4 weeks. Let them know if the injection was effective.  Do not get the COVID or FLU VACCINE for 2 weeks after your injection    Call your doctor or go to the Emergency Room for evaluation:  If you have NEW severe pain.  Fever greater than 100.5 degrees.  Loss of control of your arms.

## 2025-03-03 NOTE — TELEPHONE ENCOUNTER
Called Pt and scheduled follow up appt on 3/25/25 for CT pelvis and cervical spine , Right C5-C6 nerve root injection with corticosteroid done 3/3/25.  Danilo MAGAÑA

## 2025-04-01 ENCOUNTER — TELEPHONE (OUTPATIENT)
Dept: ORTHOPEDICS | Facility: CLINIC | Age: 45
End: 2025-04-01
Payer: MEDICARE

## 2025-04-01 DIAGNOSIS — M54.2 CERVICAL PAIN: Primary | ICD-10-CM

## 2025-04-01 NOTE — TELEPHONE ENCOUNTER
Writer called and scheduled pt a follow up appointment with Domitila BLAIR for 4/4/25 at 10 am.     Tamanna Patel LPN

## 2025-04-04 ENCOUNTER — ANCILLARY PROCEDURE (OUTPATIENT)
Dept: GENERAL RADIOLOGY | Facility: CLINIC | Age: 45
End: 2025-04-04
Attending: PHYSICIAN ASSISTANT
Payer: MEDICARE

## 2025-04-04 DIAGNOSIS — M54.2 CERVICAL PAIN: ICD-10-CM

## 2025-04-04 PROCEDURE — 72040 X-RAY EXAM NECK SPINE 2-3 VW: CPT | Performed by: RADIOLOGY

## 2025-04-04 NOTE — PROGRESS NOTES
"VIRTUAL VISIT:  Patient is evaluated today via billable virtual visit.    The patient has been notified of following:   \"This virtual visit will be conducted via a call between you and your physician/provider. We have found that certain health care needs can be provided without the need for an in-person physical exam.  This service lets us provide the care you need with a virtual conversation.  If a prescription is necessary we can send it directly to your pharmacy.  If lab work is needed we can place an order for that and you can then stop by our lab to have the test done at a later time.  If during the course of the call the physician/provider feels a virtual visit is not appropriate, you will not be charged for this service.\"     Spine Surgical Hx:   12/27/2015 - ALIF with integrated anterior fixation L5-S1; use of allograft (Dr. Duong).   2017/18 - SCS placement.  04/17/2019 - LLIF (Left approach) with lateral plate fixation L4-5; PISF with right foraminotomy L4-5 (Dr. Maninder Whipple).  02/16/2023 - MIS Left SIJ fusion (JG Whipple).  [Implants: SI-Bone iFuse 3D x 3 rods].      Physician has received verbal consent for a Virtual Visit from the patient.  Platform used:  Airizu.  Sent her video link, waited, no response.  I called her on telephone; her phone could not access video link or does not have video capability; so we just conducted the visit on telephone.  Time:  2:23pm to 2:41pm  Originating Location (pt. Location): Home  Distant Location (provider location):  [facility] [place]     Chief Complaint   Patient presents with    RECHECK       Last Visit Date: 10/31/24  Previous Impression:  1.  Mild C5-6, C6-C7 cervical disc herniations without central canal stenosis or neuroforaminal stenosis  2.  History of failed left MIS SI joint fusion  Previous Plan:  - Smoking cessation  - CT pelvis and cervical spine prior to follow-up  - Right C5-C6 nerve root injection with " corticosteroid  Follow-up virtually after undergoing injection to evaluate symptoms      S>  44 year old female, RHD, here to review pelvis and c-spine CT and injection response, and discuss further options.    3/3/25 - Right C6 SNRB with steroid (Dr. Espnial).  Per report, pain improved from 6/10 to 3.  On day of procedure, felt the best she's had in a long time.  90% relief x ~ 2 hours, then wore off.  Completely worn off by the following day.    Per patient, neck pain has gotten significantly worse.   Neck pain posterior midline, described as sharp.  Constantly burning.  Tingling and numbness in fingers (R hand > Left).  More with long, ring and small fingers.  Neck 80%, Arms/hands 20% R>L.    Did PT (ordered by Dr. Whipple), [facility] [place].  However, the therapist was concerned because she was having numbness and tingling in fingers, so they stopped PT.    Works part time, labels products.  Does not involve heavy lifting.    From previous clinic note:  Neck 80% Back 10%, Leg 10%,  Right > Left  Worse: Lying flat physical therapy  Better: Medications     Previous treatment:   Physical therapy  Percocet 10 mg 5 times a day (2 years of medication, managed by [facility] pain clinic)  Baclofen  Gabapentin 900 mg 3 times a day     Previous Injections:  8/23/2024 -  left SI joint injection which provided 50% relief for 3 hours      Oswestry (MYRA) Questionnaire        4/7/2025     3:25 PM   OSWESTRY DISABILITY INDEX   Count 10    Sum 31    Oswestry Score (%) 62 %        Patient-reported      MYRA 10/31/24 64.44%   MYRA 3/22/25 54%      Neck Disability Index (NDI) Questionnaire        4/8/2025    12:45 PM   Neck Disability Index (NDI)   Neck Disability Index: Count 10   NDI: Total Score = SUM (points for all 10 findings) 27   Neck Disability in Percent = (Total Score) / 50 * 100 54 (%)      NDI 4/8/25 54%      PROMIS-10 Scores  Global Mental Health Score: (Patient-Rptd) (P)  13  Global Physical Health Score: (Patient-Rptd) (P) 9  PROMIS TOTAL - SUBSCORES: (Patient-Rptd) (P) 22    Physical Examination:    This was a virtual visit, so very limited exam could be performed.  Patient seemed alert, oriented x 3, cooperative, with coherent speech, and not in distress.  Able to respond to questions appropriately and follow instructions.    Imaging:    Cervical MRI shows disc degnerative changes with posterior disc protrusion C5-6 and C6-7.  No significant canal or foraminal stenosis; no cord deformation of signal change.    Cervical CT shows relatively mild spondylosis C5-6 and C6-7.    Cervical flex-ext x-rays do not show significant segmental instability.    Cervical ap-lat x-rays essentially unremarkable.    Assessment:    44/f RHD with:  Cervical spine:  1.  Chronic axial neck pain 2' to degenerative disc disease C5-6 and C6-7.  2.  Central disc protrusions C5-6 and C6-7 without significant canal or foraminal stenosis.  Lumbar spine:  1.  History of multiple lumbar spinal surgeries, including ALIF L5-S1 (2015 Dr. Duong); LLIF, lateral plate fixation and PISF L4-5 (2019 Dr. Whipple); MIS Left SIJ fusion (2023 Dr. Whipple).  2.  Chronic lumbosacral back pain.    Plan:    Had good long discussion with patient.  At this point, she feels she has already failed nonoperative treatment, including physical therapy at [facility] in [place].  She is unhappy with her current level of symptoms, which she considers highly disabling (NDI 54%).  Imaging studies show degenerative disc changes with central disc herniations C5-6 and C6-7.  Right C6 nerve root block gave her very good relief (90%) for 2 hours.  This would suggest that the right C6 nerve is indeed mediating a significant portion of her pain.  We discussed surgical treatment options.  I offered to perform two-level disc arthroplasty C5-6 and C6-7.  Given her young age, arthroplasty would be preferable than fusion, to minimize  the risk of adjacent segment degeneration and need for future surgery.  We discussed the rationale for the above surgery, risks, benefits and alternatives.  I discussed that with an anterior approach, she could expect throat swelling, sore throat sensation, and some dysphagia.  This is expected to improve over the next few weeks after surgery.  We discussed anticipated postsurgical recovery and restrictions.  Patient elects to proceed.    - Case request: Cervical total disc arthroplasty C5-6 and C6-7.  - PAC referral.    Needs to quit smoking prior to surgery, with negative urine nicotine test.    >30 minutes spent on the date of the encounter doing chart review/review of outside records/review of test results/interpretation of tests/patient visit/documentation/discussion with other provider(s)/discussion with patient and family.    Nir Crocker MD    Orthopaedic Spine Surgery  Dept Orthopaedic Surgery, [facility]  [phone] office, [phone] pager  [email or web address]

## 2025-04-08 ENCOUNTER — VIRTUAL VISIT (OUTPATIENT)
Dept: ORTHOPEDICS | Facility: CLINIC | Age: 45
End: 2025-04-08
Payer: MEDICARE

## 2025-04-08 VITALS
SYSTOLIC BLOOD PRESSURE: 95 MMHG | BODY MASS INDEX: 23.05 KG/M2 | HEIGHT: 70 IN | WEIGHT: 161 LBS | DIASTOLIC BLOOD PRESSURE: 55 MMHG

## 2025-04-08 DIAGNOSIS — M50.122 CERVICAL DISC DISORDER AT C5-C6 LEVEL WITH RADICULOPATHY: Primary | ICD-10-CM

## 2025-04-08 DIAGNOSIS — M50.30 DDD (DEGENERATIVE DISC DISEASE), CERVICAL: ICD-10-CM

## 2025-04-08 DIAGNOSIS — M50.123 CERVICAL DISC DISORDER AT C6-C7 LEVEL WITH RADICULOPATHY: ICD-10-CM

## 2025-04-08 PROCEDURE — 3074F SYST BP LT 130 MM HG: CPT | Mod: 95 | Performed by: ORTHOPAEDIC SURGERY

## 2025-04-08 PROCEDURE — 3078F DIAST BP <80 MM HG: CPT | Mod: 95 | Performed by: ORTHOPAEDIC SURGERY

## 2025-04-08 PROCEDURE — 98006 SYNCH AUDIO-VIDEO EST MOD 30: CPT | Performed by: ORTHOPAEDIC SURGERY

## 2025-04-08 PROCEDURE — 1125F AMNT PAIN NOTED PAIN PRSNT: CPT | Mod: 95 | Performed by: ORTHOPAEDIC SURGERY

## 2025-04-08 ASSESSMENT — PAIN SCALES - GENERAL: PAINLEVEL_OUTOF10: SEVERE PAIN (8)

## 2025-04-08 NOTE — PROGRESS NOTES
"Virtual Visit Details    Type of service:  Video Visit   Video Start Time: {video visit start/end time for provider to select:988804}  Video End Time:{video visit start/end time for provider to select:670814}    Originating Location (pt. Location): {video visit patient location:367620::\"Home\"}  {PROVIDER LOCATION On-site should be selected for visits conducted from your clinic location or adjoining [facility] hospital, academic office, or other nearby [facility] building. Off-site should be selected for all other provider locations, including home:267191}  Distant Location (provider location):  {virtual location provider:979145}  Platform used for Video Visit: {Virtual Visit Platforms:403178::\"DocuTAP\"}    "

## 2025-04-08 NOTE — NURSING NOTE
Current patient location:  park    Is the patient currently in the state of [place]? YES    Visit mode: VIDEO    If the visit is dropped, the patient can be reconnected by:VIDEO VISIT: Text to cell phone:   Telephone Information:   Mobile [phone]       Will anyone else be joining the visit? NO  (If patient encounters technical issues they should call [phone])    Are changes needed to the allergy or medication list? No    Are refills needed on medications prescribed by this physician? NO    Rooming Documentation:  Not applicable    Reason for visit: ENRIQUETA NICEF

## 2025-04-08 NOTE — LETTER
"4/8/2025      Kevyn Dillon  [address] [place] [address]      Dear Colleague,    Thank you for referring your patient, Kevyn Dillon, to the [facility]. Please see a copy of my visit note below.    VIRTUAL VISIT:  Patient is evaluated today via billable virtual visit.    The patient has been notified of following:   \"This virtual visit will be conducted via a call between you and your physician/provider. We have found that certain health care needs can be provided without the need for an in-person physical exam.  This service lets us provide the care you need with a virtual conversation.  If a prescription is necessary we can send it directly to your pharmacy.  If lab work is needed we can place an order for that and you can then stop by our lab to have the test done at a later time.  If during the course of the call the physician/provider feels a virtual visit is not appropriate, you will not be charged for this service.\"     Spine Surgical Hx:   12/27/2015 - ALIF with integrated anterior fixation L5-S1; use of allograft (Dr. Duong).   2017/18 - SCS placement.  04/17/2019 - LLIF (Left approach) with lateral plate fixation L4-5; PISF with right foraminotomy L4-5 (Dr. Maninder Whipple).  02/16/2023 - MIS Left SIJ fusion (JG Whipple).  [Implants: SI-Bone iFuse 3D x 3 rods].      Physician has received verbal consent for a Virtual Visit from the patient.  Platform used:  [employer].  Sent her video link, waited, no response.  I called her on telephone; her phone could not access video link or does not have video capability; so we just conducted the visit on telephone.  Time:  2:23pm to 2:41pm  Originating Location (pt. Location): Home  Distant Location (provider location):  [facility]     Chief Complaint   Patient presents with     RECHECK       Last Visit Date: 10/31/24  Previous Impression:  1.  Mild C5-6, C6-C7 cervical disc " herniations without central canal stenosis or neuroforaminal stenosis  2.  History of failed left MIS SI joint fusion  Previous Plan:  - Smoking cessation  - CT pelvis and cervical spine prior to follow-up  - Right C5-C6 nerve root injection with corticosteroid  Follow-up virtually after undergoing injection to evaluate symptoms      S>  44 year old female, RHD, here to review pelvis and c-spine CT and injection response, and discuss further options.    3/3/25 - Right C6 SNRB with steroid (Dr. Espinal).  Per report, pain improved from 6/10 to 3.  On day of procedure, felt the best she's had in a long time.  90% relief x ~ 2 hours, then wore off.  Completely worn off by the following day.    Per patient, neck pain has gotten significantly worse.   Neck pain posterior midline, described as sharp.  Constantly burning.  Tingling and numbness in fingers (R hand > Left).  More with long, ring and small fingers.  Neck 80%, Arms/hands 20% R>L.    Did PT (ordered by Dr. Whipple), 3 [facility] [place].  However, the therapist was concerned because she was having numbness and tingling in fingers, so they stopped PT.    Works part time, labels products.  Does not involve heavy lifting.    From previous clinic note:  Neck 80% Back 10%, Leg 10%,  Right > Left  Worse: Lying flat physical therapy  Better: Medications     Previous treatment:   Physical therapy  Percocet 10 mg 5 times a day (2 years of medication, managed by [facility] pain [facility])  Baclofen  Gabapentin 900 mg 3 times a day     Previous Injections:  8/23/2024 -  left SI joint injection which provided 50% relief for 3 hours      Oswestry (MYRA) Questionnaire        4/7/2025     3:25 PM   OSWESTRY DISABILITY INDEX   Count 10    Sum 31    Oswestry Score (%) 62 %        Patient-reported      MYRA 10/31/24 64.44%   MYRA 3/22/25 54%      Neck Disability Index (NDI) Questionnaire        4/8/2025    12:45 PM   Neck Disability Index (NDI)    Neck Disability Index: Count 10   NDI: Total Score = SUM (points for all 10 findings) 27   Neck Disability in Percent = (Total Score) / 50 * 100 54 (%)      NDI 4/8/25 54%      PROMIS-10 Scores  Global Mental Health Score: (Patient-Rptd) (P) 13  Global Physical Health Score: (Patient-Rptd) (P) 9  PROMIS TOTAL - SUBSCORES: (Patient-Rptd) (P) 22    Physical Examination:    This was a virtual visit, so very limited exam could be performed.  Patient seemed alert, oriented x 3, cooperative, with coherent speech, and not in distress.  Able to respond to questions appropriately and follow instructions.    Imaging:    Cervical MRI shows disc degnerative changes with posterior disc protrusion C5-6 and C6-7.  No significant canal or foraminal stenosis; no cord deformation of signal change.    Cervical CT shows relatively mild spondylosis C5-6 and C6-7.    Cervical flex-ext x-rays do not show significant segmental instability.    Cervical ap-lat x-rays essentially unremarkable.    Assessment:    44/f RHD with:  Cervical spine:  1.  Chronic axial neck pain 2' to degenerative disc disease C5-6 and C6-7.  2.  Central disc protrusions C5-6 and C6-7 without significant canal or foraminal stenosis.  Lumbar spine:  1.  History of multiple lumbar spinal surgeries, including ALIF L5-S1 (2015 Dr. Duong); LLIF, lateral plate fixation and PISF L4-5 (2019 Dr. Whipple); MIS Left SIJ fusion (2023 Dr. Whipple).  2.  Chronic lumbosacral back pain.    Plan:    Had good long discussion with patient.  At this point, she feels she has already failed nonoperative treatment, including physical therapy [facility] in [place].  She is unhappy with her current level of symptoms, which she considers highly disabling (NDI 54%).  Imaging studies show degenerative disc changes with central disc herniations C5-6 and C6-7.  Right C6 nerve root block gave her very good relief (90%) for 2 hours.  This would suggest that the right C6 nerve  is indeed mediating a significant portion of her pain.  We discussed surgical treatment options.  I offered to perform two-level disc arthroplasty C5-6 and C6-7.  Given her young age, arthroplasty would be preferable than fusion, to minimize the risk of adjacent segment degeneration and need for future surgery.  We discussed the rationale for the above surgery, risks, benefits and alternatives.  I discussed that with an anterior approach, she could expect throat swelling, sore throat sensation, and some dysphagia.  This is expected to improve over the next few weeks after surgery.  We discussed anticipated postsurgical recovery and restrictions.  Patient elects to proceed.    - Case request: Cervical total disc arthroplasty C5-6 and C6-7.  - PAC referral.    Needs to quit smoking prior to surgery, with negative urine nicotine test.    >30 minutes spent on the date of the encounter doing chart review/review of outside records/review of test results/interpretation of tests/patient visit/documentation/discussion with other provider(s)/discussion with patient and family.    Nir Crocker MD    Orthopaedic Spine Surgery  Dept Orthopaedic Surgery, [facility]  [phone] office, [phone] pager  [email or web address]      Again, thank you for allowing me to participate in the care of your patient.        Sincerely,        Nir Crocker MD    Electronically signed

## 2025-04-09 ENCOUNTER — TELEPHONE (OUTPATIENT)
Dept: ORTHOPEDICS | Facility: CLINIC | Age: 45
End: 2025-04-09
Payer: MEDICARE

## 2025-04-09 NOTE — TELEPHONE ENCOUNTER
Patient is scheduled for surgery with Dr. Crocker     Spoke with: Kevyn    Date of Surgery: 7/25/25    Location: [facility] OR    Informed patient they will need an adult  Yes    Pre op with Provider PAC, 6/27/25 at 1115AM    Additional imaging/appointments: PO Made    Surgery packet: Sent      Additional comments: Added to waiting list/cancellation list.         Celeste House on 4/9/2025 at 1:05 PM

## 2025-04-14 NOTE — PATIENT INSTRUCTIONS
1. Skin biopsy  2. MRI of the lower leg  3. Genetic Counselor referral     Writer reviewed AVS with patient. She verbalized understanding and all questions were answered. Printed After Visit Summary (AVS) given upon discharge.

## 2025-04-15 NOTE — TELEPHONE ENCOUNTER
FUTURE VISIT INFORMATION      SURGERY INFORMATION:  Date: 25  Location: [facility] OR  Surgeon:  Nir Crocker MD   Anesthesia Type:  General   Procedure: Anterior cervical disc arthroplasty cervical 5-6 and 6-7.  Consult: 25    RECORDS REQUESTED FROM:       Primary Care Provider: Andre Mario DO - [facility]     Pertinent Medical History: Immunodeficiency; Anemia B12 deficiency; Sjogren's Syndrome; Nicotine dependence; Hypothyroidism;     Most recent EKG+ Tracin/10/19 - [place]

## 2025-05-06 ENCOUNTER — MYC MEDICAL ADVICE (OUTPATIENT)
Dept: ORTHOPEDICS | Facility: CLINIC | Age: 45
End: 2025-05-06
Payer: MEDICARE

## 2025-05-12 NOTE — TELEPHONE ENCOUNTER
See many My Chart messages back & forth with pt. & Spine PA C/O increased pain scheduled for Cervical surgery 7-14-25.    PA  advised RTN appt. For eval.   I called pt who agreed .  Denied Fall or accident.  XR done 4-4-25.  I offered appt this week 5-15-25 in canceled slot but pt can not due to sons appt. At [place].  Pt made appt for next week 5-22-25 in canceled slot.    Advised icing & OTC patches or creams & pt agreed.  Call back prn.  Padma Padron RN.

## 2025-05-16 NOTE — PROGRESS NOTES
In-Person Visit    Spine Surgical Hx:   12/27/2015 - ALIF with integrated anterior fixation L5-S1; use of allograft (Dr. Duong).   2017/18 - SCS placement.  04/17/2019 - LLIF (Left approach) with lateral plate fixation L4-5; PISF with right foraminotomy L4-5 (Dr. Maninder Whipple).  02/16/2023 - MIS Left SIJ fusion (JG Whipple).  [Implants: SI-Bone iFuse 3D x 3 rods].      Chief Complaint   Patient presents with    RECHECK     XR CERVICAL DONE 4-4-25. Follow up CT pelvis and cervical spine , Right C5-C6 nerve root injection with corticosteroid done 3/3/25. SURGERY SCHEDULED FOR 7-14-25     Last Visit Date: 4/8/2025  Previous Impression:  44/f RHD with:  Cervical spine:  1.  Chronic axial neck pain 2' to degenerative disc disease C5-6 and C6-7.  2.  Central disc protrusions C5-6 and C6-7 without significant canal or foraminal stenosis.  Lumbar spine:  1.  History of multiple lumbar spinal surgeries, including ALIF L5-S1 (2015 Dr. Duong); LLIF, lateral plate fixation and PISF L4-5 (2019 Dr. Whipple); MIS Left SIJ fusion (2023 Dr. Whipple).  2.  Chronic lumbosacral back pain.  Previous Plan:  - Case request: Cervical total disc arthroplasty C5-6 and C6-7.  - PAC referral.  Needs to quit smoking prior to surgery, with negative urine nicotine test.     S>  45 year old female, above surgery scheduled on 7/14/25; here due to worsening pain.     Reports worsening neck pain x2 months. No recent trauma/ injury  Continues to endorse chronic neck pain similar to previous visits, but has new pain above that radiates into the base of the skull.  Associated with headaches  Feels popping sensations and then sharp pain that radiates into the skull.  Denies new onset weakness, but reports that her right arm generally feels weaker  States that it is hard for her to  things with her right hand, she is right-handed  Denies changes to balance.  She continues to take medication regimen as described below; he cannot take NSAIDs because she  "is a \"bleeder\"    From previous note:  Quit smoking 4 months ago.      Conservative management  Physical therapy- was discnotinued due to hand numbness ([facility] [place] [address])  Medications:   Percocet 10 mg 5 times a day (2 years of medication, managed by [facility] pain clinic).  Baclofen, Gabapentin 900 mg 3 times a day.   Cannot take NSAIDs  Previous Injections:  2024 -  left SI joint injection which provided 50% relief for 3 hours  3/3/25 - right C6 SNRB- brought pain down 2/10 from 8/10 for 1 day, no long term relief   Has had 20 injections prior and none have helped.       Oswestry (MYRA) Questionnaire        2025     1:21 PM   OSWESTRY DISABILITY INDEX   Count 10    Sum 30    Oswestry Score (%) 60 %        Patient-reported      MYRA 10/31/24 64.44%   MYRA 3/22/25 54%  MYRA 25 62%      Neck Disability Index (NDI) Questionnaire        2025    12:45 PM   Neck Disability Index (NDI)   Neck Disability Index: Count 10   NDI: Total Score = SUM (points for all 10 findings) 27   Neck Disability in Percent = (Total Score) / 50 * 100 54 (%)      NDI  25  54%     PROMIS-10 Scores  Global Mental Health Score: (Patient-Rptd) (P) 10  Global Physical Health Score: (Patient-Rptd) (P) 8  PROMIS TOTAL - SUBSCORES: (Patient-Rptd) (P) 18    Physical Examination:    Alert, oriented x 3, cooperative.  Not in CP distress.  There were no vitals taken for this visit.  Ambulates independently, non-ataxic gait.   Pain in neck exacerbated with biceps and triceps strength testing.  5/5 bilateral deltoid, biceps, wrist flexion, wrist extension, interossei strength.  4+/5 right triceps, 5/5 left triceps.  4/5 right  strength, 5/5 left  strength  Diminished sensation to light touch in the C6 and 7 distributions on the right  Full, painful neck range of motion    [email or web address] XR cervical spine AP/lateral views: Stable appearance of cervical spine sagittal and coronal alignment.  Mild " spondylosis.  No significant spondylolisthesis or fracture.    Assessment:    45/f RHD with:  Cervical spine:  1.  Chronic axial neck pain 2' to degenerative disc disease C5-6 and C6-7.  2.  Central disc protrusions C5-6 and C6-7 without significant canal or foraminal stenosis.  Lumbar spine:  1.  History of multiple lumbar spinal surgeries, including ALIF L5-S1 (2015 Dr. Duong); LLIF, lateral plate fixation and PISF L4-5 (2019 Dr. hWipple); MIS Left SIJ fusion (2023 Dr. Whipple).  2.  Chronic lumbosacral back pain.  3.  Chronic opioid use (Percocet 10/325mg, ~5 tab/day; managed by [facility])  4.  Former tobacco use, quit over 6 months ago    Plan:    Reviewed patient's recent XR and older MRI results with her which does not give clear explanation for new symptoms proximal to her chronic neck pain symptoms.  Would recommend updating MRI to evaluate for new disc herniation or other problem that could explain her new symptoms.  We will tentatively keep current surgical plan, can adjust based on updated MRI results.    - Ordered MRI cervical spine without contrast    Follow-up virtual with the patient after MRI    Aysha Castaneda PA-C    Attestation:  I (Dr. Nir Crocker - Spine Surgeon) have personally evaluated patient with QUETA Castaneda, and agree with findings and plan outlined in the note, which I also edited.  I discussed at length with the patient/family, explained the nature of spinal condition, and formulated workup and/or treatment plan together.  All questions were answered to the best of my ability and to patient's apparent satisfaction.    15 minutes spent on the date of the encounter doing chart review/review of outside records/review of test results/interpretation of tests/patient visit/documentation/discussion with other provider(s)/discussion with patient and family.    Nir Crocker MD    Orthopaedic Spine Surgery  Dept Orthopaedic Surgery, [facility]  Physicians  [phone] office, [phone] pager  [email or web address]

## 2025-05-22 ENCOUNTER — OFFICE VISIT (OUTPATIENT)
Dept: ORTHOPEDICS | Facility: CLINIC | Age: 45
End: 2025-05-22
Payer: MEDICARE

## 2025-05-22 DIAGNOSIS — M50.122 CERVICAL DISC DISORDER AT C5-C6 LEVEL WITH RADICULOPATHY: Primary | ICD-10-CM

## 2025-05-22 DIAGNOSIS — M50.123 CERVICAL DISC DISORDER AT C6-C7 LEVEL WITH RADICULOPATHY: ICD-10-CM

## 2025-05-22 DIAGNOSIS — M50.30 DDD (DEGENERATIVE DISC DISEASE), CERVICAL: ICD-10-CM

## 2025-05-22 DIAGNOSIS — M54.2 CERVICAL PAIN: ICD-10-CM

## 2025-05-22 PROCEDURE — 1125F AMNT PAIN NOTED PAIN PRSNT: CPT | Performed by: PHYSICIAN ASSISTANT

## 2025-05-22 PROCEDURE — 99213 OFFICE O/P EST LOW 20 MIN: CPT | Performed by: PHYSICIAN ASSISTANT

## 2025-05-22 NOTE — LETTER
5/22/2025      Kevyn Dillon  [address] [place] [address]      Dear Colleague,    Thank you for referring your patient, Kevyn Dillon, to the [facility] [place]. Please see a copy of my visit note below.    In-Person Visit    Spine Surgical Hx:   12/27/2015 - ALIF with integrated anterior fixation L5-S1; use of allograft (Dr. Duong).   2017/18 - SCS placement.  04/17/2019 - LLIF (Left approach) with lateral plate fixation L4-5; PISF with right foraminotomy L4-5 (Dr. Maninder Whipple).  02/16/2023 - MIS Left SIJ fusion (JG Whipple).  [Implants: SI-Bone iFuse 3D x 3 rods].      Chief Complaint   Patient presents with     RECHECK     XR CERVICAL DONE 4-4-25. Follow up CT pelvis and cervical spine , Right C5-C6 nerve root injection with corticosteroid done 3/3/25. SURGERY SCHEDULED FOR 7-14-25     Last Visit Date: 4/8/2025  Previous Impression:  44/f RHD with:  Cervical spine:  1.  Chronic axial neck pain 2' to degenerative disc disease C5-6 and C6-7.  2.  Central disc protrusions C5-6 and C6-7 without significant canal or foraminal stenosis.  Lumbar spine:  1.  History of multiple lumbar spinal surgeries, including ALIF L5-S1 (2015 Dr. Duong); LLIF, lateral plate fixation and PISF L4-5 (2019 Dr. Whipple); MIS Left SIJ fusion (2023 Dr. Whipple).  2.  Chronic lumbosacral back pain.  Previous Plan:  - Case request: Cervical total disc arthroplasty C5-6 and C6-7.  - PAC referral.  Needs to quit smoking prior to surgery, with negative urine nicotine test.     S>  45 year old female, above surgery scheduled on 7/14/25; here due to worsening pain.     Reports worsening neck pain x2 months. No recent trauma/ injury  Continues to endorse chronic neck pain similar to previous visits, but has new pain above that radiates into the base of the skull.  Associated with headaches  Feels popping sensations and then sharp pain that radiates into the skull.  Denies new onset weakness, but reports  "that her right arm generally feels weaker  States that it is hard for her to  things with her right hand, she is right-handed  Denies changes to balance.  She continues to take medication regimen as described below; he cannot take NSAIDs because she is a \"bleeder\"    From previous note:  Quit smoking 4 months ago.      Conservative management  Physical therapy- was discnotinued due to hand numbness ([facility] [place] [address])  Medications:   Percocet 10 mg 5 times a day (2 years of medication, managed by [facility]).  Baclofen, Gabapentin 900 mg 3 times a day.   Cannot take NSAIDs  Previous Injections:  8/23/2024 -  left SI joint injection which provided 50% relief for 3 hours  3/3/25 - right C6 SNRB- brought pain down 2/10 from 8/10 for 1 day, no long term relief   Has had 20 injections prior and none have helped.       Oswestry (MYRA) Questionnaire        5/19/2025     1:21 PM   OSWESTRY DISABILITY INDEX   Count 10    Sum 30    Oswestry Score (%) 60 %        Patient-reported      MYRA 10/31/24 64.44%   MYRA 3/22/25 54%  MYRA 4/7/25 62%      Neck Disability Index (NDI) Questionnaire        4/8/2025    12:45 PM   Neck Disability Index (NDI)   Neck Disability Index: Count 10   NDI: Total Score = SUM (points for all 10 findings) 27   Neck Disability in Percent = (Total Score) / 50 * 100 54 (%)      NDI  4/8/25  54%     PROMIS-10 Scores  Global Mental Health Score: (Patient-Rptd) (P) 10  Global Physical Health Score: (Patient-Rptd) (P) 8  PROMIS TOTAL - SUBSCORES: (Patient-Rptd) (P) 18    Physical Examination:    Alert, oriented x 3, cooperative.  Not in CP distress.  There were no vitals taken for this visit.  Ambulates independently, non-ataxic gait.   Pain in neck exacerbated with biceps and triceps strength testing.  5/5 bilateral deltoid, biceps, wrist flexion, wrist extension, interossei strength.  4+/5 right triceps, 5/5 left triceps.  4/5 right  strength, 5/5 left  " strength  Diminished sensation to light touch in the C6 and 7 distributions on the right  Full, painful neck range of motion    [email or web address] XR cervical spine AP/lateral views: Stable appearance of cervical spine sagittal and coronal alignment.  Mild spondylosis.  No significant spondylolisthesis or fracture.    Assessment:    45/f RHD with:  Cervical spine:  1.  Chronic axial neck pain 2' to degenerative disc disease C5-6 and C6-7.  2.  Central disc protrusions C5-6 and C6-7 without significant canal or foraminal stenosis.  Lumbar spine:  1.  History of multiple lumbar spinal surgeries, including ALIF L5-S1 ( Dr. Duong); LLIF, lateral plate fixation and PISF L4-5 ( Dr. Whipple); MIS Left SIJ fusion ( Dr. Whipple).  2.  Chronic lumbosacral back pain.  3.  Chronic opioid use (Percocet 10/325mg, ~5 tab/day; managed by [facility])  4.  Former tobacco use, quit over 6 months ago    Plan:    Reviewed patient's recent XR and older MRI results with her which does not give clear explanation for new symptoms proximal to her chronic neck pain symptoms.  Would recommend updating MRI to evaluate for new disc herniation or other problem that could explain her new symptoms.  We will tentatively keep current surgical plan, can adjust based on updated MRI results.    - Ordered MRI cervical spine without contrast    Follow-up virtual with the patient after MRI    Aysha Castaneda PA-C    Attestation:  I (Dr. Nir Crocker - Spine Surgeon) have personally evaluated patient with QUETA Castaneda, and agree with findings and plan outlined in the note, which I also edited.  I discussed at length with the patient/family, explained the nature of spinal condition, and formulated workup and/or treatment plan together.  All questions were answered to the best of my ability and to patient's apparent satisfaction.    15 minutes spent on the date of the encounter doing chart review/review of outside records/review  of test results/interpretation of tests/patient visit/documentation/discussion with other provider(s)/discussion with patient and family.    Nir Crocker MD    Orthopaedic Spine Surgery  Dept Orthopaedic Surgery, [facility]  [phone] office, [phone] pager  [email or web address]     Again, thank you for allowing me to participate in the care of your patient.        Sincerely,        Nir Crocker MD    Electronically signed

## 2025-05-27 ENCOUNTER — HOSPITAL ENCOUNTER (OUTPATIENT)
Dept: MRI IMAGING | Facility: CLINIC | Age: 45
Discharge: HOME | End: 2025-05-27
Attending: PHYSICIAN ASSISTANT
Payer: MEDICARE

## 2025-05-27 DIAGNOSIS — M54.2 CERVICAL PAIN: ICD-10-CM

## 2025-05-27 DIAGNOSIS — M50.123 CERVICAL DISC DISORDER AT C6-C7 LEVEL WITH RADICULOPATHY: ICD-10-CM

## 2025-05-27 DIAGNOSIS — M50.122 CERVICAL DISC DISORDER AT C5-C6 LEVEL WITH RADICULOPATHY: ICD-10-CM

## 2025-05-27 DIAGNOSIS — M50.30 DDD (DEGENERATIVE DISC DISEASE), CERVICAL: ICD-10-CM

## 2025-05-27 PROCEDURE — 72141 MRI NECK SPINE W/O DYE: CPT

## 2025-05-27 PROCEDURE — 72141 MRI NECK SPINE W/O DYE: CPT | Mod: 26 | Performed by: STUDENT IN AN ORGANIZED HEALTH CARE EDUCATION/TRAINING PROGRAM

## 2025-06-22 LAB
ABO + RH BLD: NORMAL
BLD GP AB SCN SERPL QL: NEGATIVE
SPECIMEN EXP DATE BLD: NORMAL

## 2025-06-23 ENCOUNTER — ANESTHESIA EVENT (OUTPATIENT)
Dept: SURGERY | Facility: CLINIC | Age: 45
End: 2025-06-23
Payer: MEDICARE

## 2025-06-23 ENCOUNTER — LAB (OUTPATIENT)
Dept: LAB | Facility: CLINIC | Age: 45
End: 2025-06-23
Payer: MEDICARE

## 2025-06-23 ENCOUNTER — APPOINTMENT (OUTPATIENT)
Dept: LAB | Facility: CLINIC | Age: 45
End: 2025-06-23
Payer: MEDICARE

## 2025-06-23 ENCOUNTER — OFFICE VISIT (OUTPATIENT)
Dept: SURGERY | Facility: CLINIC | Age: 45
End: 2025-06-23
Payer: MEDICARE

## 2025-06-23 VITALS
DIASTOLIC BLOOD PRESSURE: 74 MMHG | RESPIRATION RATE: 16 BRPM | BODY MASS INDEX: 24.48 KG/M2 | HEIGHT: 70 IN | TEMPERATURE: 97.7 F | OXYGEN SATURATION: 96 % | WEIGHT: 171 LBS | HEART RATE: 59 BPM | SYSTOLIC BLOOD PRESSURE: 111 MMHG

## 2025-06-23 DIAGNOSIS — Z01.818 PREOP EXAMINATION: ICD-10-CM

## 2025-06-23 DIAGNOSIS — M50.10 CERVICAL DISC DISORDER WITH RADICULOPATHY: ICD-10-CM

## 2025-06-23 DIAGNOSIS — Z01.818 PREOP EXAMINATION: Primary | ICD-10-CM

## 2025-06-23 PROCEDURE — 36415 COLL VENOUS BLD VENIPUNCTURE: CPT | Performed by: PATHOLOGY

## 2025-06-23 PROCEDURE — 86900 BLOOD TYPING SEROLOGIC ABO: CPT | Mod: 6MC

## 2025-06-23 PROCEDURE — 99203 OFFICE O/P NEW LOW 30 MIN: CPT | Performed by: PHYSICIAN ASSISTANT

## 2025-06-23 RX ORDER — VALACYCLOVIR HYDROCHLORIDE 500 MG/1
500 TABLET, FILM COATED ORAL 2 TIMES DAILY
COMMUNITY
Start: 2025-06-19

## 2025-06-23 RX ORDER — LEFLUNOMIDE 20 MG/1
20 TABLET ORAL EVERY MORNING
COMMUNITY
Start: 2025-05-12

## 2025-06-23 RX ORDER — VARENICLINE TARTRATE 1 MG/1
1 TABLET, FILM COATED ORAL 2 TIMES DAILY
COMMUNITY
Start: 2025-06-10

## 2025-06-23 RX ORDER — SUCRALFATE 1 G/1
1 TABLET ORAL AT BEDTIME
COMMUNITY

## 2025-06-23 RX ORDER — PANTOPRAZOLE SODIUM 40 MG/1
40 TABLET, DELAYED RELEASE ORAL
COMMUNITY
Start: 2025-06-16

## 2025-06-23 ASSESSMENT — LIFESTYLE VARIABLES: TOBACCO_USE: 1

## 2025-06-23 ASSESSMENT — PAIN SCALES - GENERAL: PAINLEVEL_OUTOF10: SEVERE PAIN (7)

## 2025-06-23 ASSESSMENT — ENCOUNTER SYMPTOMS: SEIZURES: 0

## 2025-06-23 NOTE — PATIENT INSTRUCTIONS
Preparing for Your Surgery      Name:  Kevyn Dillon   MRN:  [id number]   :  1980   Today's Date:  2025     The [place] [employer] [address] Construction Project                                Timeline 2025 -2025    This project will affect travel to the [facility] and [facility], as well as the [facility].      Please check the [facility] [address] project website for the most up to date information and give yourself additional time to reach your destination.        Arriving for surgery:  Surgery date:  2025  Arrival time:  10:45 am    Please come to:       [facility] [address]   [address] [place] [place]  [address]     The Green Ramp for patients and visitors is beneath the [facility]. The parking facility entrance is at the intersection of [address] [place] [address] and [address]. Patients and visitors who self-park will receive the reduced hospital parking rate (no ticket validation needed).     [employer] parking, located at the [facility] main entrance on [address] [place] [address], is available Monday - Friday from 7 am to 3:30 pm.     Discounted parking pass options can be purchased from  attendants during business hours.     -Check in at the security desk in the [facility] ([facility])   Lobby. They will direct you to the correct elevators.   -Proceed to the 3rd floor, Adult Surgery Waiting Lounge. [phone]     If you need directions, a wheelchair or escort please stop at the Information Desk in the lobby.  Inform the information person you are here for surgery; a wheelchair and escort to [address] will be provided.   An escort to the Adult Surgery Waiting Lounge will be provided. .    What can I eat or drink?  -  You may eat and drink normally up to 8 hours prior to arrival time. (Until 2:45 am)  -   You may have clear liquids until 2 hours prior to arrival time. (Until 8:45 am)    Examples of clear liquids:  Water  Clear broth  Juices (apple, white grape, white cranberry  and cider) without pulp  Noncarbonated, powder based beverages  (lemonade and Samy-Aid)  Sodas (Sprite, 7-Up, ginger ale and seltzer)  Coffee or tea (without milk or cream)  Gatorade    -  No Alcohol or cannabis products for at least 24 hours before surgery.     Which medicines can I take?    Hold Aspirin for 7 days before surgery.   Hold Multivitamins for 7 days before surgery.  Hold Supplements for 7 days before surgery.  Hold Ibuprofen (Advil, Motrin) for 3 day(s) before surgery--unless otherwise directed by surgeon.  Hold Naproxen (Aleve) for 4 days before surgery.    Continue to hold etanercept (Enbrel) dose.     -  DO NOT take these medications the day of surgery:    Cholecalciferol   Cyanocobalamin (Vitamin B-12)  Sucralfate (Carafate)   Chantix     -  PLEASE TAKE these medications the day of surgery:    Acetaminophen (Tylenol)   Baclofen (Lioresal)   Fexofenadine (Allegra)   Fluticasone (Flonase)  Gabapentin (Neurontin)   Leflunomide (Arava)   Levothyroxine (Synthroid)   Lidocaine Patch - notify pre-op team it is in place  Oxycodone-acetaminophen (Percocet)   Pantoprazole (Protonix)   Valacyclovir (Valtrex), if needed        How do I prepare myself?  - Please take 2 showers (one the night prior to surgery and one the morning of surgery) using Scrubcare or Hibiclens soap.    Use this soap only from the neck to your toes. Avoid genital area      Leave the soap on your skin for one minute--then rinse thoroughly.      You may use your own shampoo and conditioner. No other hair products.   - Please remove all jewelry and body piercings.  - No lotions, deodorants or fragrance.  - No makeup or fingernail polish.   - Bring your ID and insurance card.    -For patients being admitted to the [facility]  Family members are to take the  patient belongings with them and place them in the lockers provided in the Family Lounge.  Please limit the items you bring to 1 bag as the lockers are small.      -If you use a CPAP machine, please bring the CPAP machine, tubing, and mask to hospital.    -If you have a Deep Brain Stimulator, Spinal Cord Stimulator, or any Neuro Stimulator device---you must bring the remote control to the hospital.      ALL PATIENTS GOING HOME THE SAME DAY OF SURGERY ARE REQUIRED TO HAVE A RESPONSIBLE ADULT TO DRIVE AND BE IN ATTENDANCE WITH THEM FOR 24 HOURS FOLLOWING SURGERY.    Covid testing policy as of 12/06/2022  Your surgeon will notify and schedule you for a COVID test if one is needed before surgery--please direct any questions or COVID symptoms to your surgeon      Questions or Concerns:    - For any questions regarding the day of surgery or your hospital stay, please contact the Pre Admission Nursing Office at [phone].       - If you have health changes between today and your surgery, please call your surgeon.       - For questions after surgery, please call your surgeons office.           Current Visitor Guidelines    2 adult visitors for adult patients in the pre op area    If additional visitors come (beyond a patient care attendant or a group home caregiver), the additional visitors will be asked to wait in the main lobby of the hospital    Visiting hours: 8 a.m. to 8:30 p.m.    Patients confirmed or suspected to have symptoms of COVID 19 or flu:     No visitors allowed for adult patients.   Children (under age 18) can have 1 named visitor.     People who are sick or showing symptoms of COVID 19 or flu:    Are not allowed to visit patients--we can only make exceptions in special situations.       Please follow these guidelines for your visit:          Please maintain social distance          Masking is optional--however at times you may be asked to wear a mask for the safety of yourself and others     Clean your  hands with alcohol hand . Do this when you arrive at and leave the building and patient room,    And again after you touch your mask or anything in the room.     Go directly to and from the room you are visiting.     Stay in the patient s room during your visit. Limit going to other places in the hospital as much as possible     Leave bags and jackets at home or in the car.     For everyone s health, please don t come and go during your visit. That includes for smoking   during your visit.     _______________________________________________________________________________      OPTIMAL RECOVERY AFTER SURGERY        Begin hydrating yourself by drinking at least 8-10 glasses of clear liquids for 24 hours before surgery:      Suggested clear liquids:   Water    Clear Juices   Clear Broth   Non- carbonated beverages    (Crystal Light or Samy Aid)   Sodas    (Sprite, 7 up, ginger ale, seltzer)   Gatorade          We would like you to purchase a drink such as Gatorade or Ensure Clear (not the milkshake type).  Drink this before bedtime and the morning of surgery drink between 8-10 ounces or until you feel hydrated.        Keeping well hydrated leads to your veins being plump, you wake up faster, and you are less likely to be nauseated. Start drinking water as soon as you can after surgery and advance to clear liquids and food as tolerated.  IV fluids contain salt, drinking fluids will minimize the amount of IV fluids you need and decrease the amount of salt you get.       The most common reason for the patient to be readmitted is dehydration. Staying hydrated after you go home from the hospital is very important.  Ensure or Ensure Clear are good options to keep you hydrated.

## 2025-06-23 NOTE — H&P
Pre-Operative H & P     CC:  Preoperative exam to assess for increased cardiopulmonary risk while undergoing surgery and anesthesia.    Date of Encounter: 6/23/2025  Primary Care Physician:  No Ref-Primary, Physician     Reason for visit:   Encounter Diagnoses   Name Primary?    Preop examination Yes    Cervical disc disorder with radiculopathy        AYLA Dillon is a 45 year old female who presents for pre-operative H & P in preparation for  Procedure Information       Case: [id number] Date/Time: 06/30/25 1310    Procedure: Anterior cervical disc arthroplasty cervical 5-6 and 6-7. (Spine)    Anesthesia type: General    Diagnosis:       Cervical disc disorder at C5-C6 level with radiculopathy [M50.122]      Cervical disc disorder at C6-C7 level with radiculopathy [M50.123]      DDD (degenerative disc disease), cervical [M50.30]    Pre-op diagnosis:       Cervical disc disorder at C5-C6 level with radiculopathy [M50.122]      Cervical disc disorder at C6-C7 level with radiculopathy [M50.123]      DDD (degenerative disc disease), cervical [M50.30]    Location: [facility] / [facility]    Providers: Nir Crocker MD            Ms. Dillon has a PMH significant for overactive bladder, smoking, allergic rhinitis, neuropathy in feet, hypothyroidism, chronic opioid use, and inflammatory arthritis. She has undergone the following spinal surgeries per Dr. Crocker's clinic note 5/22/25:    12/27/2015 - ALIF with integrated anterior fixation L5-S1; use of allograft (Dr. Duong).   2017/18 - SCS placement.  04/17/2019 - LLIF (Left approach) with lateral plate fixation L4-5; PISF with right foraminotomy L4-5 (Dr. Maninder Whipple).  02/16/2023 - MIS Left SIJ fusion (JG Whipple).  [Implants: SI-Bone iFuse 3D x 3 rods].    She has a more recent history of worsening neck pain with popping sensations and sharp pain. Her right arm feels weaker and she has difficulty with right . She has failed nonoperative treatment  including physical therapy and wishes to proceed with the above surgery.    History is obtained from the patient and chart review    Hx of abnormal bleeding or anti-platelet use: Denies    Menstrual history: No LMP recorded. Patient has had an ablation.:      Past Medical History  Past Medical History:   Diagnosis Date    Hypothyroidism     Inflammatory arthritis        Past Surgical History  Past Surgical History:   Procedure Laterality Date    cervical fusion         Prior to Admission Medications  Current Outpatient Medications   Medication Sig Dispense Refill    acetaminophen (TYLENOL) 500 MG tablet Take 1,000 mg by mouth      baclofen (LIORESAL) 10 MG tablet 20 mg 3 times daily       cholecalciferol 25 MCG (1000 UT) TABS Take 50 mcg by mouth every morning.      cyanocobalamin (VITAMIN B-12) 500 MCG tablet Take 500 mcg by mouth every morning.      etanercept (ENBREL) 50 MG/ML injection Inject 50 mg subcutaneously once a week.      fexofenadine (ALLEGRA) 180 MG tablet Take 180 mg by mouth every morning.      fluticasone (FLONASE) 50 MCG/ACT nasal spray Spray 2 sprays in nostril      gabapentin (NEURONTIN) 300 MG capsule Take 900 mg by mouth 3 times daily       leflunomide (ARAVA) 20 MG tablet Take 20 mg by mouth every morning.      levothyroxine (SYNTHROID/LEVOTHROID) 25 MCG tablet Take 25 mcg by mouth every morning (before breakfast).      lidocaine (LIDODERM) 5 % patch Place 1 patch onto the skin      oxyCODONE-acetaminophen (PERCOCET)  MG per tablet Take 1 tablet by mouth every 6 hours as needed for breakthrough pain.      pantoprazole (PROTONIX) 40 MG EC tablet Take 40 mg by mouth every morning (before breakfast).      sucralfate (CARAFATE) 1 GM tablet Take 1 g by mouth at bedtime.      valACYclovir (VALTREX) 1000 mg tablet TAKE 2 TABLETS(2000 MG) BY MOUTH TWICE DAILY AS NEEDED FOR COLD SORES      valACYclovir (VALTREX) 500 MG tablet Take 500 mg by mouth 2 times daily.      varenicline (CHANTIX) 1 MG  tablet Take 1 mg by mouth 2 times daily.         Allergies  Allergies   Allergen Reactions    Codeine Hives and Rash     Tolerates oxycodone      Cyclobenzaprine Hives, Rash and Other (See Comments)     In mouth    Liquid Adhesive Rash     Adhesive    Adhesive Tape Hives and Itching     *ONLY FROM FENTANYL PATCH*    Fentanyl Hives     Noted in Transfer Records- on 12/16/10    Only allergic to the ADHESIVE part of Fentanyl patch    Morphine And Codeine Hives and Other (See Comments)    Penicillins      Other reaction(s): GI intolerance       Social History  Social History     Socioeconomic History    Marital status: Single     Spouse name: Not on file    Number of children: Not on file    Years of education: Not on file    Highest education level: Not on file   Occupational History    Not on file   Tobacco Use    Smoking status: Former     Types: Cigarettes    Smokeless tobacco: Never    Tobacco comments:     Quit 12/2024   Substance and Sexual Activity    Alcohol use: Never    Drug use: Not Currently    Sexual activity: Not on file   Other Topics Concern    Not on file   Social History Narrative    Not on file     Social Drivers of Health     Financial Resource Strain: Medium Risk (3/4/2023)    Received from [facility]    Overall Financial Resource Strain (CARDIA)     Difficulty of Paying Living Expenses: Somewhat hard   Food Insecurity: Patient Declined (4/14/2025)    Received from [facility]    Hunger Vital Sign     Worried About Running Out of Food in the Last Year: Patient declined     Ran Out of Food in the Last Year: Patient declined   Transportation Needs: Patient Declined (4/14/2025)    Received from [facility]    PRAPARE - Transportation     Lack of Transportation (Medical): Patient declined     Lack of Transportation (Non-Medical): Patient declined   Physical Activity: Inactive (4/14/2025)    Received from [facility]    Exercise Vital Sign     Days of Exercise per Week: 0 days     Minutes of Exercise  per Session: 0 min   Stress: No Stress Concern Present (3/4/2023)    Received from [facility]    [employer] - Occupational Stress Questionnaire     Feeling of Stress : Not at all   Social Connections: Socially Isolated (3/4/2023)    Received from [facility]    Social Connection and Isolation Panel [NHANES]     Frequency of Communication with Friends and Family: Three times a week     Frequency of Social Gatherings with Friends and Family: Once a week     Attends Catholic Services: Never     Active Member of Clubs or Organizations: No     Attends Club or Organization Meetings: Never     Marital Status: Never    Interpersonal Safety: Not At Risk (3/4/2023)    Received from [facility]    Humiliation, Afraid, Rape, and Kick questionnaire     Fear of Current or Ex-Partner: No     Emotionally Abused: No     Physically Abused: No     Sexually Abused: No   Housing Stability: Patient Declined (4/14/2025)    Received from [facility]    Housing Stability     What is your living situation today?: Patient declined       Family History  Family History   Problem Relation Age of Onset    Anesthesia Reaction No family hx of     Deep Vein Thrombosis No family hx of        Review of Systems  The complete review of systems is negative other than noted in the HPI or here.     Anesthesia Evaluation   Pt has had prior anesthetic.     No history of anesthetic complications       ROS/MED HX  ENT/Pulmonary:     (+)           allergic rhinitis,     tobacco use, Past use,                    (-) asthma, sleep apnea and recent URI   Neurologic:  - neg neurologic ROS  (-) no seizures and no CVA   Cardiovascular:     (+)  - -   -  - -                                 No previous cardiac testing     METS/Exercise Tolerance: >4 METS Comment: Limited by chronic pain but denies exertional symptoms when walking/climbing stairs   Hematologic:     (+)       history of blood transfusion, no previous transfusion  "reaction,  - received transfusion for childbirth,   (-) history of blood clots   Musculoskeletal: Comment: Inflammatory arthritis on Enbrel  (+)  arthritis,             GI/Hepatic:     (+) GERD, Asymptomatic on medication,           liver disease (NAFLD),       Renal/Genitourinary:       Endo:     (+)          thyroid problem, hypothyroidism,        (-) obesity   Psychiatric/Substance Use:  - neg psychiatric ROS   (+)    H/O chronic opioid use .     Infectious Disease:  - neg infectious disease ROS     Malignancy:  - neg malignancy ROS     Other: Comment: sjogren's     (+)  , H/O Chronic Pain,         /74 (BP Location: Right arm, Patient Position: Sitting, Cuff Size: Adult Large)   Pulse 59   Temp 97.7  F (36.5  C) (Oral)   Resp 16   Ht 1.778 m (5' 10\")   Wt 77.6 kg (171 lb)   SpO2 96%   Breastfeeding No   BMI 24.54 kg/m      Physical Exam   Constitutional: Awake, alert, cooperative, no apparent distress, and appears stated age.  Eyes: Pupils equal, round and reactive to light, extra ocular muscles intact, sclera clear, conjunctiva normal.  HENT: Normocephalic, oral pharynx with moist mucus membranes, full dentures. No goiter appreciated.   Respiratory: Clear to auscultation bilaterally, no crackles or wheezing.  Cardiovascular: Regular rate and rhythm, normal S1 and S2, and no murmur noted. No edema. Palpable pulses to radial and PT arteries.   GI: Not assessed  Lymph/Hematologic: No cervical lymphadenopathy and no supraclavicular lymphadenopathy.  Genitourinary:  deferred  Skin: Warm and dry.  No rashes at anticipated surgical site.   Musculoskeletal: Full ROM of neck. There is no redness, warmth, or swelling of the joints. Gross motor strength is normal.    Neurologic: Awake, alert, oriented to name, place and time. Cranial nerves II-XII are grossly intact. Gait is normal.   Neuropsychiatric: Calm, cooperative. Normal affect.     Prior Labs/Diagnostic Studies   All labs and imaging pertinent to the " visit personally reviewed     Outside labs 5/20/25  Hemoglobin  11.6 - 15.0 g/dL 13.5   Hematocrit  35.5 - 44.9 % 41.5   Erythrocytes  3.92 - 5.13 x10(12)/L 4.24   MCV  78.2 - 97.9 fL 97.9   RBC Distrib Width  12.2 - 16.1 % 13.9   Platelet Count  157 - 371 x10(9)/L 221   Leukocytes  3.4 - 9.6 x10(9)/L 4.0   Neutrophils  1.56 - 6.45 x10(9)/L 1.21 Low    Lymphocytes  0.95 - 3.07 x10(9)/L 2.31   Monocytes  0.26 - 0.81 x10(9)/L 0.39   Eosinophils  0.03 - 0.48 x10(9)/L 0.07   Basophils  0.01 - 0.08 x10(9)/L <0.03     Potassium, P  3.6 - 5.2 mmol/L 4.1   Sodium, P  135 - 145 mmol/L 140   Chloride, P  98 - 107 mmol/L 106   Bicarbonate, P  22 - 29 mmol/L 23   Anion Gap, P  7 - 15 11   BUN (Blood Urea Nitrogen), P  6 - 21 mg/dL 12   Creatinine  0.59 - 1.04 mg/dL 0.65   Estimated GFR (eGFR)  >=60 mL/min/BSA >90   Comment: Estimated GFR calculated using the 2021  CKD_EPI creatinine equation.   Calcium, Total, P  8.6 - 10.0 mg/dL 9.0   Glucose, P  70 - 140 mg/dL 102       EKG/ stress test - if available please see in ROS above       The patient's records and results pertinent to the visit personally reviewed by this provider.     Outside records reviewed from: Care Everywhere        Assessment    Kevyn Dillon is a 45 year old female seen as a PAC referral for risk assessment and optimization for anesthesia.    Plan/Recommendations  Pt will be optimized for the proposed procedure.  See below for details on the assessment, risk, and preoperative recommendations    NEUROLOGY  - No history of TIA, CVA or seizure  - Chronic Pain  On chronic opioids, morphine equivalent = 75 MME/day (5 Percocet per day , treated by pain clinic)  - has spinal stim in place but not currently using.  It is turned off.  -Post Op delirium risk factors:  No risk identified    ENT  - No current airway concerns.  Will need to be reassessed day of surgery.  Mallampati: II  TM: > 3    - full dentures    CARDIAC  - denies cardiac history  - Denies  "chest pain, SOB, HALL, palpitations    - METS (Metabolic Equivalents)  Patient performs 4 or more METS exercise without symptoms             Total Score: 0      RCRI-Very low risk: Class 1 0.4% complication rate             Total Score: 0        PULMONARY    CLEMENT Low Risk             Total Score: 0      - Denies asthma or inhaler use    - Tobacco History    History   Smoking Status    Former    Types: Cigarettes   Smokeless Tobacco    Never       GI  - GERD, continue omeprazole and carafate    PONV High Risk  Total Score: 3           1 AN PONV: Pt is Female    1 AN PONV: Patient is not a current smoker    1 AN PONV: Intended Post Op Opioids        /RENAL  - Baseline Creatinine  0.65    ENDOCRINE    - BMI: Estimated body mass index is 24.54 kg/m  as calculated from the following:    Height as of this encounter: 1.778 m (5' 10\").    Weight as of this encounter: 77.6 kg (171 lb).  Healthy Weight (BMI 18.5-24.9)  - No history of Diabetes Mellitus  - Thyroid disorder  Continue home replacement.    HEME  VTE Low Risk 0.26%             Total Score: 0      - No history of abnormal bleeding or antiplatelet use.    A type and screen has been ordered for this patient    MSK/RHEUM  Patient is NOT Frail             Total Score: 0      - Sjogren's, seronegative inflammatory arthritis  - Last dose of Enbrel on 6/2/25, holding for above surgery  - can continue leflunomide    Different anesthesia methods/types have been discussed with the patient, but they are aware that the final plan will be decided by the assigned anesthesia provider on the date of service.      The patient is optimized for their procedure. AVS with information on surgery time/arrival time, meds and NPO status given by nursing staff. No further diagnostic testing indicated.        34 minutes were spent on the date of the encounter performing chart review, history and exam, documentation and/or discussion with other providers about the issues documented " above.    Fely Sim PA-C  Preoperative Assessment Center  [facility]  Clinic and Surgery Center  Phone: [phone]  Fax: [phone]

## 2025-06-27 ENCOUNTER — PRE VISIT (OUTPATIENT)
Dept: SURGERY | Facility: CLINIC | Age: 45
End: 2025-06-27

## 2025-06-30 ENCOUNTER — HOSPITAL ENCOUNTER (OUTPATIENT)
Facility: CLINIC | Age: 45
Setting detail: OBSERVATION
Discharge: HOME | End: 2025-07-01
Attending: ORTHOPAEDIC SURGERY | Admitting: ORTHOPAEDIC SURGERY
Payer: MEDICARE

## 2025-06-30 ENCOUNTER — APPOINTMENT (OUTPATIENT)
Dept: GENERAL RADIOLOGY | Facility: CLINIC | Age: 45
End: 2025-06-30
Attending: ORTHOPAEDIC SURGERY
Payer: MEDICARE

## 2025-06-30 ENCOUNTER — MEDICAL CORRESPONDENCE (OUTPATIENT)
Dept: HEALTH INFORMATION MANAGEMENT | Facility: CLINIC | Age: 45
End: 2025-06-30

## 2025-06-30 ENCOUNTER — ANESTHESIA (OUTPATIENT)
Dept: SURGERY | Facility: CLINIC | Age: 45
End: 2025-06-30
Payer: MEDICARE

## 2025-06-30 DIAGNOSIS — M50.920 CERVICAL DISC DISORDER OF MID-CERVICAL REGION: Primary | ICD-10-CM

## 2025-06-30 PROBLEM — Z98.1 STATUS POST CERVICAL ARTHRODESIS: Status: ACTIVE | Noted: 2025-06-30

## 2025-06-30 LAB — GLUCOSE BLDC GLUCOMTR-MCNC: 87 MG/DL (ref 70–99)

## 2025-06-30 PROCEDURE — 22856 TOT DISC ARTHRP 1NTRSPC CRV: CPT | Mod: CG | Performed by: ORTHOPAEDIC SURGERY

## 2025-06-30 PROCEDURE — 370N000017 HC ANESTHESIA TECHNICAL FEE, PER MIN: Performed by: ORTHOPAEDIC SURGERY

## 2025-06-30 PROCEDURE — 272N000001 HC OR GENERAL SUPPLY STERILE: Performed by: ORTHOPAEDIC SURGERY

## 2025-06-30 PROCEDURE — 82962 GLUCOSE BLOOD TEST: CPT

## 2025-06-30 PROCEDURE — 250N000011 HC RX IP 250 OP 636: Performed by: NURSE ANESTHETIST, CERTIFIED REGISTERED

## 2025-06-30 PROCEDURE — 258N000003 HC RX IP 258 OP 636

## 2025-06-30 PROCEDURE — 250N000009 HC RX 250: Performed by: NURSE ANESTHETIST, CERTIFIED REGISTERED

## 2025-06-30 PROCEDURE — 250N000013 HC RX MED GY IP 250 OP 250 PS 637

## 2025-06-30 PROCEDURE — 250N000011 HC RX IP 250 OP 636: Mod: JW | Performed by: NURSE ANESTHETIST, CERTIFIED REGISTERED

## 2025-06-30 PROCEDURE — 250N000013 HC RX MED GY IP 250 OP 250 PS 637: Performed by: PHYSICIAN ASSISTANT

## 2025-06-30 PROCEDURE — 120N000002 HC R&B MED SURG/OB UMMC

## 2025-06-30 PROCEDURE — 250N000009 HC RX 250: Performed by: PHYSICIAN ASSISTANT

## 2025-06-30 PROCEDURE — 96376 TX/PRO/DX INJ SAME DRUG ADON: CPT

## 2025-06-30 PROCEDURE — 710N000010 HC RECOVERY PHASE 1, LEVEL 2, PER MIN: Performed by: ORTHOPAEDIC SURGERY

## 2025-06-30 PROCEDURE — 22858 TOT DISC ARTHRP 2ND LVL CRV: CPT | Mod: GC | Performed by: ORTHOPAEDIC SURGERY

## 2025-06-30 PROCEDURE — 250N000011 HC RX IP 250 OP 636: Performed by: PHYSICIAN ASSISTANT

## 2025-06-30 PROCEDURE — 250N000011 HC RX IP 250 OP 636: Performed by: ANESTHESIOLOGY

## 2025-06-30 PROCEDURE — 96375 TX/PRO/DX INJ NEW DRUG ADDON: CPT | Mod: 59

## 2025-06-30 PROCEDURE — C1889 IMPLANT/INSERT DEVICE, NOC: HCPCS | Performed by: ORTHOPAEDIC SURGERY

## 2025-06-30 PROCEDURE — 250N000025 HC SEVOFLURANE, PER MIN: Performed by: ORTHOPAEDIC SURGERY

## 2025-06-30 PROCEDURE — 360N000084 HC SURGERY LEVEL 4 W/ FLUORO, PER MIN: Performed by: ORTHOPAEDIC SURGERY

## 2025-06-30 PROCEDURE — 999N000141 HC STATISTIC PRE-PROCEDURE NURSING ASSESSMENT: Performed by: ORTHOPAEDIC SURGERY

## 2025-06-30 PROCEDURE — 258N000003 HC RX IP 258 OP 636: Performed by: NURSE ANESTHETIST, CERTIFIED REGISTERED

## 2025-06-30 PROCEDURE — 999N000180 XR SURGERY CARM FLUORO LESS THAN 5 MIN

## 2025-06-30 PROCEDURE — 96374 THER/PROPH/DIAG INJ IV PUSH: CPT | Mod: 59

## 2025-06-30 PROCEDURE — 250N000009 HC RX 250: Performed by: ORTHOPAEDIC SURGERY

## 2025-06-30 PROCEDURE — 258N000003 HC RX IP 258 OP 636: Performed by: PHYSICIAN ASSISTANT

## 2025-06-30 PROCEDURE — 250N000011 HC RX IP 250 OP 636

## 2025-06-30 DEVICE — PRESTIGE LP DISC 6X14MM
Type: IMPLANTABLE DEVICE | Site: SPINE CERVICAL | Status: FUNCTIONAL
Brand: PRESTIGE® LP CERVICAL DISC SYSTEM

## 2025-06-30 RX ORDER — KETOROLAC TROMETHAMINE 15 MG/ML
15 INJECTION, SOLUTION INTRAMUSCULAR; INTRAVENOUS ONCE
Status: COMPLETED | OUTPATIENT
Start: 2025-06-30 | End: 2025-06-30

## 2025-06-30 RX ORDER — CEFAZOLIN SODIUM/WATER 2 G/20 ML
2 SYRINGE (ML) INTRAVENOUS SEE ADMIN INSTRUCTIONS
Status: DISCONTINUED | OUTPATIENT
Start: 2025-06-30 | End: 2025-06-30 | Stop reason: HOSPADM

## 2025-06-30 RX ORDER — FENTANYL CITRATE 50 UG/ML
50 INJECTION, SOLUTION INTRAMUSCULAR; INTRAVENOUS
Refills: 0 | Status: COMPLETED | OUTPATIENT
Start: 2025-06-30 | End: 2025-06-30

## 2025-06-30 RX ORDER — FENTANYL CITRATE 50 UG/ML
25 INJECTION, SOLUTION INTRAMUSCULAR; INTRAVENOUS EVERY 5 MIN PRN
Status: DISCONTINUED | OUTPATIENT
Start: 2025-06-30 | End: 2025-06-30

## 2025-06-30 RX ORDER — ONDANSETRON 4 MG/1
4 TABLET, ORALLY DISINTEGRATING ORAL EVERY 6 HOURS PRN
Status: DISCONTINUED | OUTPATIENT
Start: 2025-06-30 | End: 2025-07-01 | Stop reason: HOSPADM

## 2025-06-30 RX ORDER — CEFAZOLIN SODIUM/WATER 2 G/20 ML
2 SYRINGE (ML) INTRAVENOUS
Status: COMPLETED | OUTPATIENT
Start: 2025-06-30 | End: 2025-06-30

## 2025-06-30 RX ORDER — ACETAMINOPHEN 325 MG/1
975 TABLET ORAL ONCE
Status: COMPLETED | OUTPATIENT
Start: 2025-06-30 | End: 2025-06-30

## 2025-06-30 RX ORDER — DEXAMETHASONE SODIUM PHOSPHATE 4 MG/ML
4 INJECTION, SOLUTION INTRA-ARTICULAR; INTRALESIONAL; INTRAMUSCULAR; INTRAVENOUS; SOFT TISSUE
Status: DISCONTINUED | OUTPATIENT
Start: 2025-06-30 | End: 2025-06-30

## 2025-06-30 RX ORDER — ONDANSETRON 2 MG/ML
4 INJECTION INTRAMUSCULAR; INTRAVENOUS EVERY 30 MIN PRN
Status: DISCONTINUED | OUTPATIENT
Start: 2025-06-30 | End: 2025-06-30

## 2025-06-30 RX ORDER — HYDROMORPHONE HCL IN WATER/PF 6 MG/30 ML
0.4 PATIENT CONTROLLED ANALGESIA SYRINGE INTRAVENOUS
Status: DISCONTINUED | OUTPATIENT
Start: 2025-06-30 | End: 2025-07-01 | Stop reason: HOSPADM

## 2025-06-30 RX ORDER — ONDANSETRON 2 MG/ML
4 INJECTION INTRAMUSCULAR; INTRAVENOUS EVERY 6 HOURS PRN
Status: DISCONTINUED | OUTPATIENT
Start: 2025-06-30 | End: 2025-07-01 | Stop reason: HOSPADM

## 2025-06-30 RX ORDER — FENTANYL CITRATE 50 UG/ML
50 INJECTION, SOLUTION INTRAMUSCULAR; INTRAVENOUS EVERY 5 MIN PRN
Status: DISCONTINUED | OUTPATIENT
Start: 2025-06-30 | End: 2025-06-30

## 2025-06-30 RX ORDER — BISACODYL 10 MG
10 SUPPOSITORY, RECTAL RECTAL DAILY PRN
Status: DISCONTINUED | OUTPATIENT
Start: 2025-07-03 | End: 2025-07-01 | Stop reason: HOSPADM

## 2025-06-30 RX ORDER — NALOXONE HYDROCHLORIDE 0.4 MG/ML
0.4 INJECTION, SOLUTION INTRAMUSCULAR; INTRAVENOUS; SUBCUTANEOUS
Status: DISCONTINUED | OUTPATIENT
Start: 2025-06-30 | End: 2025-07-01 | Stop reason: HOSPADM

## 2025-06-30 RX ORDER — NALOXONE HYDROCHLORIDE 0.4 MG/ML
0.2 INJECTION, SOLUTION INTRAMUSCULAR; INTRAVENOUS; SUBCUTANEOUS
Status: DISCONTINUED | OUTPATIENT
Start: 2025-06-30 | End: 2025-07-01 | Stop reason: HOSPADM

## 2025-06-30 RX ORDER — KETAMINE HYDROCHLORIDE 10 MG/ML
INJECTION INTRAMUSCULAR; INTRAVENOUS PRN
Status: DISCONTINUED | OUTPATIENT
Start: 2025-06-30 | End: 2025-06-30

## 2025-06-30 RX ORDER — HYDROMORPHONE HYDROCHLORIDE 1 MG/ML
0.5 INJECTION, SOLUTION INTRAMUSCULAR; INTRAVENOUS; SUBCUTANEOUS
Status: DISCONTINUED | OUTPATIENT
Start: 2025-06-30 | End: 2025-06-30

## 2025-06-30 RX ORDER — OXYCODONE HYDROCHLORIDE 10 MG/1
10 TABLET ORAL EVERY 4 HOURS PRN
Status: DISCONTINUED | OUTPATIENT
Start: 2025-06-30 | End: 2025-07-01 | Stop reason: HOSPADM

## 2025-06-30 RX ORDER — CEFAZOLIN SODIUM 1 G/3ML
1 INJECTION, POWDER, FOR SOLUTION INTRAMUSCULAR; INTRAVENOUS EVERY 8 HOURS
Status: COMPLETED | OUTPATIENT
Start: 2025-06-30 | End: 2025-07-01

## 2025-06-30 RX ORDER — SODIUM CHLORIDE 9 MG/ML
INJECTION, SOLUTION INTRAVENOUS CONTINUOUS
Status: DISCONTINUED | OUTPATIENT
Start: 2025-06-30 | End: 2025-07-01 | Stop reason: HOSPADM

## 2025-06-30 RX ORDER — ACETAMINOPHEN 650 MG/1
650 SUPPOSITORY RECTAL EVERY 4 HOURS
Status: DISCONTINUED | OUTPATIENT
Start: 2025-06-30 | End: 2025-06-30

## 2025-06-30 RX ORDER — HYDROMORPHONE HYDROCHLORIDE 1 MG/ML
0.2 INJECTION, SOLUTION INTRAMUSCULAR; INTRAVENOUS; SUBCUTANEOUS EVERY 5 MIN PRN
Status: DISCONTINUED | OUTPATIENT
Start: 2025-06-30 | End: 2025-06-30

## 2025-06-30 RX ORDER — METHADONE HYDROCHLORIDE 10 MG/ML
10 INJECTION, SOLUTION INTRAMUSCULAR; INTRAVENOUS; SUBCUTANEOUS ONCE
Refills: 0 | Status: CANCELLED | OUTPATIENT
Start: 2025-06-30 | End: 2025-06-30

## 2025-06-30 RX ORDER — DEXAMETHASONE SODIUM PHOSPHATE 4 MG/ML
INJECTION, SOLUTION INTRA-ARTICULAR; INTRALESIONAL; INTRAMUSCULAR; INTRAVENOUS; SOFT TISSUE PRN
Status: DISCONTINUED | OUTPATIENT
Start: 2025-06-30 | End: 2025-06-30

## 2025-06-30 RX ORDER — HYDROMORPHONE HYDROCHLORIDE 1 MG/ML
0.2 INJECTION, SOLUTION INTRAMUSCULAR; INTRAVENOUS; SUBCUTANEOUS
Status: DISCONTINUED | OUTPATIENT
Start: 2025-06-30 | End: 2025-06-30

## 2025-06-30 RX ORDER — ONDANSETRON 4 MG/1
4 TABLET, ORALLY DISINTEGRATING ORAL EVERY 30 MIN PRN
Status: DISCONTINUED | OUTPATIENT
Start: 2025-06-30 | End: 2025-06-30

## 2025-06-30 RX ORDER — HYDROMORPHONE HYDROCHLORIDE 1 MG/ML
0.5 SOLUTION ORAL ONCE
Status: DISCONTINUED | OUTPATIENT
Start: 2025-06-30 | End: 2025-06-30

## 2025-06-30 RX ORDER — FAMOTIDINE 20 MG/1
20 TABLET, FILM COATED ORAL 2 TIMES DAILY
Status: DISCONTINUED | OUTPATIENT
Start: 2025-06-30 | End: 2025-07-01 | Stop reason: HOSPADM

## 2025-06-30 RX ORDER — AMOXICILLIN 250 MG
1 CAPSULE ORAL 2 TIMES DAILY
Status: DISCONTINUED | OUTPATIENT
Start: 2025-06-30 | End: 2025-07-01 | Stop reason: HOSPADM

## 2025-06-30 RX ORDER — GABAPENTIN 100 MG/1
300 CAPSULE ORAL
Status: COMPLETED | OUTPATIENT
Start: 2025-06-30 | End: 2025-06-30

## 2025-06-30 RX ORDER — CALCIUM CARBONATE 500 MG/1
500 TABLET, CHEWABLE ORAL 4 TIMES DAILY PRN
Status: DISCONTINUED | OUTPATIENT
Start: 2025-06-30 | End: 2025-07-01 | Stop reason: HOSPADM

## 2025-06-30 RX ORDER — LIDOCAINE HYDROCHLORIDE 20 MG/ML
INJECTION, SOLUTION INFILTRATION; PERINEURAL PRN
Status: DISCONTINUED | OUTPATIENT
Start: 2025-06-30 | End: 2025-06-30

## 2025-06-30 RX ORDER — ONDANSETRON 2 MG/ML
INJECTION INTRAMUSCULAR; INTRAVENOUS PRN
Status: DISCONTINUED | OUTPATIENT
Start: 2025-06-30 | End: 2025-06-30

## 2025-06-30 RX ORDER — FENTANYL CITRATE 50 UG/ML
INJECTION, SOLUTION INTRAMUSCULAR; INTRAVENOUS PRN
Status: DISCONTINUED | OUTPATIENT
Start: 2025-06-30 | End: 2025-06-30

## 2025-06-30 RX ORDER — NALOXONE HYDROCHLORIDE 0.4 MG/ML
0.1 INJECTION, SOLUTION INTRAMUSCULAR; INTRAVENOUS; SUBCUTANEOUS
Status: DISCONTINUED | OUTPATIENT
Start: 2025-06-30 | End: 2025-06-30

## 2025-06-30 RX ORDER — LIDOCAINE 40 MG/G
CREAM TOPICAL
Status: DISCONTINUED | OUTPATIENT
Start: 2025-06-30 | End: 2025-07-01 | Stop reason: HOSPADM

## 2025-06-30 RX ORDER — METHOCARBAMOL 750 MG/1
750 TABLET, FILM COATED ORAL EVERY 6 HOURS PRN
Status: DISCONTINUED | OUTPATIENT
Start: 2025-06-30 | End: 2025-06-30

## 2025-06-30 RX ORDER — PROCHLORPERAZINE MALEATE 10 MG
10 TABLET ORAL EVERY 6 HOURS PRN
Status: DISCONTINUED | OUTPATIENT
Start: 2025-06-30 | End: 2025-07-01 | Stop reason: HOSPADM

## 2025-06-30 RX ORDER — SODIUM CHLORIDE, SODIUM LACTATE, POTASSIUM CHLORIDE, CALCIUM CHLORIDE 600; 310; 30; 20 MG/100ML; MG/100ML; MG/100ML; MG/100ML
INJECTION, SOLUTION INTRAVENOUS CONTINUOUS PRN
Status: DISCONTINUED | OUTPATIENT
Start: 2025-06-30 | End: 2025-06-30

## 2025-06-30 RX ORDER — HYDROMORPHONE HYDROCHLORIDE 1 MG/ML
0.5 INJECTION, SOLUTION INTRAMUSCULAR; INTRAVENOUS; SUBCUTANEOUS ONCE
Status: COMPLETED | OUTPATIENT
Start: 2025-06-30 | End: 2025-06-30

## 2025-06-30 RX ORDER — SODIUM CHLORIDE, SODIUM LACTATE, POTASSIUM CHLORIDE, CALCIUM CHLORIDE 600; 310; 30; 20 MG/100ML; MG/100ML; MG/100ML; MG/100ML
INJECTION, SOLUTION INTRAVENOUS CONTINUOUS
Status: DISCONTINUED | OUTPATIENT
Start: 2025-06-30 | End: 2025-06-30

## 2025-06-30 RX ORDER — ACETAMINOPHEN 325 MG/1
975 TABLET ORAL EVERY 8 HOURS
Status: DISCONTINUED | OUTPATIENT
Start: 2025-06-30 | End: 2025-07-01 | Stop reason: HOSPADM

## 2025-06-30 RX ORDER — HYDROXYZINE HYDROCHLORIDE 25 MG/1
25 TABLET, FILM COATED ORAL EVERY 6 HOURS PRN
Status: DISCONTINUED | OUTPATIENT
Start: 2025-06-30 | End: 2025-07-01 | Stop reason: HOSPADM

## 2025-06-30 RX ORDER — POLYETHYLENE GLYCOL 3350 17 G/17G
17 POWDER, FOR SOLUTION ORAL DAILY
Status: DISCONTINUED | OUTPATIENT
Start: 2025-07-01 | End: 2025-07-01 | Stop reason: HOSPADM

## 2025-06-30 RX ORDER — BUPIVACAINE HCL/EPINEPHRINE 0.25-.0005
VIAL (ML) INJECTION PRN
Status: DISCONTINUED | OUTPATIENT
Start: 2025-06-30 | End: 2025-06-30 | Stop reason: HOSPADM

## 2025-06-30 RX ORDER — HYDROMORPHONE HYDROCHLORIDE 1 MG/ML
0.4 INJECTION, SOLUTION INTRAMUSCULAR; INTRAVENOUS; SUBCUTANEOUS EVERY 5 MIN PRN
Status: DISCONTINUED | OUTPATIENT
Start: 2025-06-30 | End: 2025-06-30

## 2025-06-30 RX ORDER — PROPOFOL 10 MG/ML
INJECTION, EMULSION INTRAVENOUS PRN
Status: DISCONTINUED | OUTPATIENT
Start: 2025-06-30 | End: 2025-06-30

## 2025-06-30 RX ORDER — METHOCARBAMOL 500 MG/1
1000 TABLET, FILM COATED ORAL EVERY 6 HOURS PRN
Status: DISCONTINUED | OUTPATIENT
Start: 2025-06-30 | End: 2025-07-01 | Stop reason: HOSPADM

## 2025-06-30 RX ADMIN — FENTANYL CITRATE 50 MCG: 0.05 INJECTION, SOLUTION INTRAMUSCULAR; INTRAVENOUS at 13:00

## 2025-06-30 RX ADMIN — ACETAMINOPHEN 975 MG: 325 TABLET ORAL at 12:00

## 2025-06-30 RX ADMIN — HYDROMORPHONE HYDROCHLORIDE 0.2 MG: 1 INJECTION, SOLUTION INTRAMUSCULAR; INTRAVENOUS; SUBCUTANEOUS at 18:18

## 2025-06-30 RX ADMIN — FENTANYL CITRATE 50 MCG: 0.05 INJECTION, SOLUTION INTRAMUSCULAR; INTRAVENOUS at 12:30

## 2025-06-30 RX ADMIN — ONDANSETRON 4 MG: 2 INJECTION INTRAMUSCULAR; INTRAVENOUS at 15:34

## 2025-06-30 RX ADMIN — HYDROMORPHONE HYDROCHLORIDE 0.5 MG: 1 INJECTION, SOLUTION INTRAMUSCULAR; INTRAVENOUS; SUBCUTANEOUS at 20:15

## 2025-06-30 RX ADMIN — Medication 2 G: at 15:10

## 2025-06-30 RX ADMIN — FAMOTIDINE 20 MG: 20 TABLET, FILM COATED ORAL at 22:05

## 2025-06-30 RX ADMIN — HYDROMORPHONE HYDROCHLORIDE 0.2 MG: 1 INJECTION, SOLUTION INTRAMUSCULAR; INTRAVENOUS; SUBCUTANEOUS at 18:34

## 2025-06-30 RX ADMIN — Medication 20 MG: at 17:00

## 2025-06-30 RX ADMIN — HYDROMORPHONE HYDROCHLORIDE 0.5 MG: 1 INJECTION, SOLUTION INTRAMUSCULAR; INTRAVENOUS; SUBCUTANEOUS at 21:05

## 2025-06-30 RX ADMIN — PROPOFOL 200 MG: 10 INJECTION, EMULSION INTRAVENOUS at 14:49

## 2025-06-30 RX ADMIN — LIDOCAINE HYDROCHLORIDE 100 MG: 20 INJECTION, SOLUTION INFILTRATION; PERINEURAL at 14:48

## 2025-06-30 RX ADMIN — HYDROMORPHONE HYDROCHLORIDE 0.4 MG: 1 INJECTION, SOLUTION INTRAMUSCULAR; INTRAVENOUS; SUBCUTANEOUS at 19:08

## 2025-06-30 RX ADMIN — TRANEXAMIC ACID 5 MG/KG/HR: 100 INJECTION INTRAVENOUS at 14:48

## 2025-06-30 RX ADMIN — ACETAMINOPHEN 975 MG: 325 TABLET ORAL at 22:05

## 2025-06-30 RX ADMIN — DEXAMETHASONE SODIUM PHOSPHATE 10 MG: 4 INJECTION, SOLUTION INTRAMUSCULAR; INTRAVENOUS at 15:28

## 2025-06-30 RX ADMIN — SODIUM CHLORIDE, SODIUM LACTATE, POTASSIUM CHLORIDE, AND CALCIUM CHLORIDE: .6; .31; .03; .02 INJECTION, SOLUTION INTRAVENOUS at 14:40

## 2025-06-30 RX ADMIN — HYDROMORPHONE HYDROCHLORIDE 0.6 MG: 1 INJECTION, SOLUTION INTRAMUSCULAR; INTRAVENOUS; SUBCUTANEOUS at 23:24

## 2025-06-30 RX ADMIN — HYDROMORPHONE HYDROCHLORIDE 0.2 MG: 1 INJECTION, SOLUTION INTRAMUSCULAR; INTRAVENOUS; SUBCUTANEOUS at 18:58

## 2025-06-30 RX ADMIN — SODIUM CHLORIDE: 0.9 INJECTION, SOLUTION INTRAVENOUS at 20:46

## 2025-06-30 RX ADMIN — CEFAZOLIN 1 G: 1 INJECTION, POWDER, FOR SOLUTION INTRAMUSCULAR; INTRAVENOUS at 22:04

## 2025-06-30 RX ADMIN — FENTANYL CITRATE 100 MCG: 50 INJECTION INTRAMUSCULAR; INTRAVENOUS at 14:48

## 2025-06-30 RX ADMIN — SUGAMMADEX 200 MG: 100 INJECTION, SOLUTION INTRAVENOUS at 17:23

## 2025-06-30 RX ADMIN — Medication 20 MG: at 15:48

## 2025-06-30 RX ADMIN — DOCUSATE SODIUM AND SENNOSIDES 1 TABLET: 8.6; 5 TABLET, FILM COATED ORAL at 22:05

## 2025-06-30 RX ADMIN — Medication 30 MG: at 14:50

## 2025-06-30 RX ADMIN — HYDROMORPHONE HYDROCHLORIDE 0.5 MG: 1 INJECTION, SOLUTION INTRAMUSCULAR; INTRAVENOUS; SUBCUTANEOUS at 17:32

## 2025-06-30 RX ADMIN — KETOROLAC TROMETHAMINE 15 MG: 15 INJECTION, SOLUTION INTRAMUSCULAR; INTRAVENOUS at 20:54

## 2025-06-30 RX ADMIN — Medication 50 MG: at 14:48

## 2025-06-30 RX ADMIN — FENTANYL CITRATE 50 MCG: 0.05 INJECTION, SOLUTION INTRAMUSCULAR; INTRAVENOUS at 18:04

## 2025-06-30 RX ADMIN — MIDAZOLAM 2 MG: 1 INJECTION INTRAMUSCULAR; INTRAVENOUS at 15:10

## 2025-06-30 RX ADMIN — DEXMEDETOMIDINE HYDROCHLORIDE 20 MCG: 100 INJECTION, SOLUTION INTRAVENOUS at 14:42

## 2025-06-30 RX ADMIN — TRANEXAMIC ACID 761 MG: 100 INJECTION INTRAVENOUS at 15:15

## 2025-06-30 RX ADMIN — ONDANSETRON 4 MG: 4 TABLET, ORALLY DISINTEGRATING ORAL at 22:25

## 2025-06-30 RX ADMIN — Medication 10 MG: at 16:19

## 2025-06-30 RX ADMIN — OXYCODONE HYDROCHLORIDE 15 MG: 10 TABLET ORAL at 23:56

## 2025-06-30 RX ADMIN — FENTANYL CITRATE 50 MCG: 0.05 INJECTION, SOLUTION INTRAMUSCULAR; INTRAVENOUS at 18:13

## 2025-06-30 RX ADMIN — HYDROMORPHONE HYDROCHLORIDE 0.2 MG: 1 INJECTION, SOLUTION INTRAMUSCULAR; INTRAVENOUS; SUBCUTANEOUS at 18:40

## 2025-06-30 RX ADMIN — FENTANYL CITRATE 100 MCG: 50 INJECTION INTRAMUSCULAR; INTRAVENOUS at 15:51

## 2025-06-30 RX ADMIN — OXYCODONE HYDROCHLORIDE 15 MG: 10 TABLET ORAL at 19:15

## 2025-06-30 RX ADMIN — HYDROMORPHONE HYDROCHLORIDE 0.2 MG: 1 INJECTION, SOLUTION INTRAMUSCULAR; INTRAVENOUS; SUBCUTANEOUS at 18:51

## 2025-06-30 RX ADMIN — GABAPENTIN 300 MG: 300 CAPSULE ORAL at 12:00

## 2025-06-30 RX ADMIN — HYDROMORPHONE HYDROCHLORIDE 0.2 MG: 1 INJECTION, SOLUTION INTRAMUSCULAR; INTRAVENOUS; SUBCUTANEOUS at 18:24

## 2025-06-30 ASSESSMENT — ACTIVITIES OF DAILY LIVING (ADL)
ADLS_ACUITY_SCORE: 41
ADLS_ACUITY_SCORE: 43
ADLS_ACUITY_SCORE: 41
ADLS_ACUITY_SCORE: 43
ADLS_ACUITY_SCORE: 41

## 2025-06-30 NOTE — ANESTHESIA PREPROCEDURE EVALUATION
Anesthesia Pre-Procedure Evaluation    Patient: Kevyn Dillon   MRN: [id number] : 1980          Procedure : Procedure(s):  Anterior Cervical Disc arthroplasty cervical 5-6 and 6-7.         Past Medical History:   Diagnosis Date    Hypothyroidism     Inflammatory arthritis       Past Surgical History:   Procedure Laterality Date    cervical fusion        Allergies   Allergen Reactions    Codeine Hives and Rash     Tolerates oxycodone      Cyclobenzaprine Hives, Rash and Other (See Comments)     In mouth    Liquid Adhesive Rash     Adhesive    Adhesive Tape Hives and Itching     *ONLY FROM FENTANYL PATCH*    Fentanyl Hives     Noted in Transfer Records- on 12/16/10    Only allergic to the ADHESIVE part of Fentanyl patch    Morphine And Codeine Hives and Other (See Comments)    Penicillins      Other reaction(s): GI intolerance      Social History     Tobacco Use    Smoking status: Former     Types: Cigarettes    Smokeless tobacco: Never    Tobacco comments:     Quit 2024   Substance Use Topics    Alcohol use: Never      Wt Readings from Last 1 Encounters:   25 77.6 kg (171 lb)        Anesthesia Evaluation   Pt has had prior anesthetic.         ROS/MED HX  ENT/Pulmonary:  - neg pulmonary ROS     Neurologic:       Cardiovascular:  - neg cardiovascular ROS     METS/Exercise Tolerance:     Hematologic:  - neg hematologic  ROS     Musculoskeletal:  - neg musculoskeletal ROS     GI/Hepatic:  - neg GI/hepatic ROS     Renal/Genitourinary:  - neg Renal ROS     Endo:     (+)          thyroid problem,            Psychiatric/Substance Use:  - neg psychiatric ROS     Infectious Disease:  - neg infectious disease ROS     Malignancy:  - neg malignancy ROS     Other:  - neg other ROS            Physical Exam  Airway  Mallampati: II  TM distance: >3 FB  Upper bite lip test: II  Mouth opening: >= 4 cm    Cardiovascular - normal exam  Rhythm: regular     Dental   (+) Minor Abnormalities - some fillings, tiny chips    "   Pulmonary - normal exam      Neurological - normal exam  She appears awake, alert and oriented x3.    Other Findings       OUTSIDE LABS:  CBC:   Lab Results   Component Value Date    WBC 13.4 (H) 03/12/2021    HGB 14.3 03/12/2021    HCT 44.5 03/12/2021     03/12/2021     BMP: No results found for: \"NA\", \"POTASSIUM\", \"CHLORIDE\", \"CO2\", \"BUN\", \"CR\", \"GLC\"  COAGS: No results found for: \"PTT\", \"INR\", \"FIBR\"  POC: No results found for: \"BGM\", \"HCG\", \"HCGS\"  HEPATIC: No results found for: \"ALBUMIN\", \"PROTTOTAL\", \"ALT\", \"AST\", \"GGT\", \"ALKPHOS\", \"BILITOTAL\", \"BILIDIRECT\", \"PRIETO\"  OTHER:   Lab Results   Component Value Date    A1C 5.2 03/12/2021       Anesthesia Plan    ASA Status:  2      NPO Status: NPO Appropriate   Anesthesia Type: General.  Airway: oral.  Maintenance: Balanced.   Techniques and Equipment:       - Monitoring Plan: standard ASA monitoring     Consents    Anesthesia Plan(s) and associated risks, benefits, and realistic alternatives discussed. Questions answered and patient/representative(s) expressed understanding.     - Discussed:     - Discussed with:  Patient        - Pt is DNR/DNI Status: DNR     - Suspend during perioperative period? Yes   Blood Consent:      - Discussed with: patient.     Postoperative Care    Pain management: multimodal analgesia.     Comments:                   Agueda Silverio MD    I have reviewed the pertinent notes and labs in the chart from the past 30 days and (re)examined the patient.  Any updates or changes from those notes are reflected in this note.    Clinically Significant Risk Factors Present on Admission                                              "

## 2025-06-30 NOTE — OR NURSING
1230 Dr Moncada notified about pt's level of pain. Pt asked RN to have IV pain medications. Dr Moncada ordered Fentanyl and RN verified with pt's allergies. Pt states she's had IV fentanyl in the  past numerous times and that he Fentanyl allergy is because of a reaction that she had to a fentanyl patch adhesive that she has a long time ago. Pt again confirms that she has no reaction to  IV fentanyl. Dr Moncada aware.  RN gave IV fentanyl and pt is placed on monitor. RN will continue to monitor pt.

## 2025-06-30 NOTE — OR NURSING
PACU to Inpatient Nursing Handoff    Patient Kevyn Dillon is a 45 year old female who speaks English.   Procedure Procedure(s):  Anterior Cervical Disc arthroplasty cervical 5-6 and 6-7.   Surgeon(s) Primary: Nir Crocker MD  Resident - Assisting: Eben Day MD; Edmundo Egan MD     Allergies   Allergen Reactions    Codeine Hives and Rash     Tolerates oxycodone      Cyclobenzaprine Hives, Rash and Other (See Comments)     In mouth    Liquid Adhesive Rash     Adhesive    Adhesive Tape Hives and Itching     *ONLY FROM FENTANYL PATCH*    Fentanyl Hives     Noted in Transfer Records- on 12/16/10    Only allergic to the ADHESIVE part of Fentanyl patch    Morphine And Codeine Hives and Other (See Comments)    Penicillins      Other reaction(s): GI intolerance       Isolation  [unfilled]     Past Medical History   has a past medical history of Hypothyroidism and Inflammatory arthritis.    Anesthesia General   Dermatome Level     Preop Meds acetaminophen (Tylenol) - time given: 1200  gabapentin (Neurontin) - time given: 1200  Fentanyl 50mcg x 2 - time given: 1230, 1300   Nerve block Not applicable   Intraop Meds dexamethasone (Decadron)  dexmedetomidine (Precedex): 20 mcg total  fentanyl (Sublimaze): 200 mcg total  hydromorphone (Dilaudid): 0.5 mg total  ketamine (Ketalar): 50 mg given  ondansetron (Zofran): last given at 1534   Local Meds Yes   Antibiotics cefazolin (Ancef) - last given at 1510     Pain Patient Currently in Pain: other (see comments) (better since the fentanyl)   PACU meds  fentanyl (Sublimaze): 100 mcg (total dose) last given at 1813   hydromorphone (Dilaudid): 1.6 mg (total dose) last given at 1908   oxycodone (Roxicodone): 15 mg (total dose) last given at 1915    PCA / epidural No   Capnography     Telemetry     Inpatient Telemetry Monitor Ordered? No        Labs Glucose Lab Results   Component Value Date    GLC 87 06/30/2025       Hgb Lab Results   Component Value Date     "HGB 14.3 03/12/2021       INR No results found for: \"INR\"   PACU Imaging Not applicable     Wound/Incision Incision/Surgical Site 06/30/25 Left Neck (Active)   Incision Assessment WDL 06/30/25 1718   Closure Adhesive strip(s);Sutures 06/30/25 1718   Dressing Intervention New dressing applied 06/30/25 1718   Number of days: 0      CMS        Equipment Not applicable   Other LDA ETT Cuffed 7 mm (Active)   Number of days: 0        IV Access Peripheral IV 06/30/25 Left;Posterior Hand (Active)   Site Assessment WDL 06/30/25 1303   Line Status Saline locked 06/30/25 1303   Dressing Securement device 06/30/25 1303   Dressing Status clean;dry;intact 06/30/25 1303   Number of days: 0       Peripheral IV 06/30/25 Right Hand (Active)   Number of days: 0      Blood Products Not applicable EBL 25   mL   Intake/Output Date 06/30/25 0700 - 07/01/25 0659   Shift 6472-8869 7385-0701 5896-0847 24 Hour Total   INTAKE   I.V.  700  700   Shift Total(mL/kg)  700(9.2)  700(9.2)   OUTPUT   Urine  0  0   Blood  25  25   Shift Total(mL/kg)  25(0.33)  25(0.33)   Weight (kg) 76.1 76.1 76.1 76.1      Drains / Hdz Drain Closed/Suction 1 Left Neck Bulb 7 Macedonian (Active)   Number of days: 0       Urinary Drain 06/30/25 Urethral Catheter Latex;Straight-tip 16 fr (Active)   Number of days: 0      Time of void PreOp Time of Void Prior to Procedure: 1144 (06/30/25 1143)    PostOp      Diapered? No   Bladder Scan     PO    tolerating sips and water     Vitals    B/P: 99/75  T: 98  F (36.7  C)    Temp src: Oral  P:  Pulse: 59 (06/30/25 1110)          R: 16  O2:  SpO2: 97 %    O2 Device: None (Room air) (06/30/25 1230)              Family/support present daughters   Patient belongings     Patient transported on cart   DC meds/scripts (obs/outpt) Not applicable   Inpatient Pain Meds Released? No       Special needs/considerations None   Tasks needing completion None       Shirley Sanford, RN  Vocera    "

## 2025-06-30 NOTE — OP NOTE
Date of Service:  6/30/2025      Surgeon:  Nir Crocker MD   Assistants:  (1) Eben Day MD, Spine Fellow.  (2) Edmundo Egan MD PGY-4.      Preoperative Diagnosis:     1.  Chronic axial neck pain 2' to degenerative disc disease C5-6 and C6-7.  2.  Central disc protrusions C5-6 and C6-7 without significant canal or foraminal stenosis.  3.  Chronic opioid use (Percocet 10/325mg, ~5 tab/day; managed by [facility])  4.  Former tobacco use, quit over 6 months ago      Postoperative Diagnosis:     Same      Procedures:   1.  Cervical total disc arthroplasty with placement of interbody prosthesis (Medtronic Prestige LP) C5-6 and C6-7 (2 levels).   2.  Use of operating microscope.       Anesthesia:  General endotracheal.   Local anesthetic:  0.25% marcaine + epinephrine = 15 mL.  EBL:  25 mL.  Complications:  None apparent.   Implants / Equipment used:   Medtronic Prestige LP:  C5-6 = 6 mm height x 14 mm depth prostheses; C6=7 = 6 mm height x 14 mm depth prostheses.      Indications:  45 year old female with chronic neck axial neck pain.  Imaging revealed spondylosis with central disc protrusions C5-6 and C6-7.  Tried nonoperative treatment, continues to have significant disabling symptoms.  I thus offered surgery in form of 2-level disc replacement C5-C7.  Consented after thorough discussion of the rationale, risks, benefits and alternatives.        Details:  Properly identified in preop area, site marked, consent signed.  Wheeled to operating theater.  Brief earlier performed.  General anesthesia administered.  Antibiotics given: Cefazolin IV.  TXA medium dose started.  Positioned supine on flat Trios table.  Arms tucked to sides using sleds; shoulders taped down.  Neck slightly extended; rolled towel placed beneath neck.  Anterior neck region squared off and prepped with Chlorhexidine scrub, ChloraPrep and draped in sterile fashion.  Surgical timeout performed.        Used lateral C-arm imaging to  localize skin incision.  1 inch incision made.  Left anterior Ruth-Lutz approach performed.  Platysma split in line with incision.  Dissected medial to SCM and carotid sheath, and lateral to strap muscles, trachea and esophagus; through pretracheal and prevertebral fascia.  Once down on spine, inserted needle into presumed C5 vertebral body.  Lateral image showed correct needle position, confirmed by radiologist; confirmatory timeout performed.  Lifted longus colli bilaterally off spine to expose C5-C7.  First worked at C5-6.  Trimline retractor blades placed; frame applied.  Lansing pins inserted into adjacent vertebrae.  Distraction applied using Lansing frame.  Microscope brought in.  Rectangular annulotomy using #15 blade.  Disc loosened using straight and angled curettes; disc material removed using pituitary.  Kaitlin used to remove anterior and overhanging osteophytes, and prepare the endplates as deep as the posterior annulus.  Posterior osteophytes removed using 2 and 3 mm Kerrisons.  Bilateral anterior foraminotomies performed using same.  We made sure to decompress the canal and foramina adequately.  Resected PLL. Epidural bleeding controlled using bipolar cautery, Surgiflo and cottonoids.  Placed trial spacer/jig for 6 mm height prosthesis; verified good position on lateral and AP images.  Punched pin tracks at four corners (2 at upper vertebra, 2 at lower vertebra).  Punched rails to create tracks.  Trial removed.  Prosthesis inserted; checked good depth on lateral image.     Same procedure performed at C6-7, thus a total of 2 levels.  Lansing pins and Trimline retractor repositioned.  Same procedure repeated.  Achieved and verified adequate decompression of canal and foramina.  Good position of both prostheses confirmed on AP and lateral C-arm images.     Irrigation.  Deep Fr 7 round SINDHU drain placed.  Closed with 2-0 Vicryl for platysma, 3-0 Vicryl for subq tissue and 4-0 Monocryl for skin.  Dermabond  and sterile dressings applied.  Anesthetic injected.  Tolerated procedure well, taken off general anesthetic, transferred to recovery in satisfactory condition.       Postop:  Admit to surgical floor to monitor for possible neck hematoma, to monitor drain output, and to complete antibiotics x 24 hours.  Mechanical DVT prophylaxis.  Advance diet slowly as tolerated.  No lifting more than 10 pounds, no excessive bending or twisting.  Soft collar for comfort.  Cervical AP-lat xrays prior to discharge.  Anticipate discharge in 1-2 days.  RTC 6 weeks with cervical ap-lat x-rays.

## 2025-06-30 NOTE — ANESTHESIA PROCEDURE NOTES
Airway       Patient location during procedure: OR       Procedure Start/Stop Times: 6/30/2025 2:51 PM  Staff -        CRNA: Keya Muniz APRN CRNA       Performed By: CRNAIndications and Patient Condition       Indications for airway management: davidson-procedural       Induction type:intravenous       Mask difficulty assessment: 1 - vent by mask    Final Airway Details       Final airway type: endotracheal airway       Successful airway: ETT - single  Endotracheal Airway Details        ETT size (mm): 7.0       Cuffed: yes       Successful intubation technique: video laryngoscopy       VL Blade Size: MAC 3       Grade View of Cords: 1       Adjucts: stylet       Position: Left       Measured from: gums/teeth       Secured at (cm): 22       Bite block used: None    Post intubation assessment        Placement verified by: capnometry, equal breath sounds and chest rise        Number of attempts at approach: 1       Number of other approaches attempted: 0       Secured with: tape       Ease of procedure: easy       Dentition: Unchanged and Intact    Medication(s) Administered   Medication Administration Time: 6/30/2025 2:51 PM

## 2025-06-30 NOTE — BRIEF OP NOTE
[facility]    Brief Operative Note    Pre-operative diagnosis: Cervical disc disorder at C5-C6 level with radiculopathy [M50.122]  Cervical disc disorder at C6-C7 level with radiculopathy [M50.123]  DDD (degenerative disc disease), cervical [M50.30]  Post-operative diagnosis Same as pre-operative diagnosis    Procedure: Anterior Cervical Disc arthroplasty cervical 5-6 and 6-7., N/A - Spine    Surgeon: Surgeons and Role:     * Nir Crocker MD - Primary     * Edmundo Egan MD - Resident - Assisting     * Eben Day MD - Resident - Assisting  Anesthesia: General   Estimated Blood Loss: Less than 50 ml    Drains: Migel-Figueroa  Specimens:   ID Type Source Tests Collected by Time Destination   A : cervical disc Tissue Other OR DOCUMENTATION ONLY Nir Crocker MD 6/30/2025  3:57 PM      Findings:   None.  Complications: None.  Implants:   Implant Name Type Inv. Item Serial No.  Lot No. LRB No. Used Action   DISC LP PRESTIGE [id number] - [id number] Total Joint Component/Insert DISC LP PRESTIGE [id number]  [id number] N/A 2 Implanted       Assessment/Plan:  Bear Woman Stately is a 45 year old female s/p Procedure(s):  Anterior Cervical Disc arthroplasty cervical 5-6 and 6-7., performed secondary to Cervical disc disorder at C5-C6 level with radiculopathy [M50.122]  Cervical disc disorder at C6-C7 level with radiculopathy [M50.123]  DDD (degenerative disc disease), cervical [M50.30] on 6/30/2025 with Dr. Crocker    Activity: Up with assist; No excessive bending or twisting; no lifting > 10 pounds x 6 weeks  Weight bearing status: WBAT  Antibiotics: Ancef x 24h  Diet: Begin with clear fluids and progress diet as tolerated; Bowel regimen  DVT prophylaxis: mechanical only, no chemical DVT PPx needed on d/c  Bracing/Splinting: Soft collar  Wound Care: Soft dressing to remain in place x 7-10 days  Drains: Document  output per shift  Pain management: transition from IV to orals as tolerated.  OK for 4mg decadron if pain on POD1  Hdz: until POD 1, then trial void per protocol  X-rays: Cervical AP/Lat XR prior to discharge -  ordered  Physical Therapy: gait training, mobilization ADLs.  Labs: None  Consults: Pain consult  Follow-up: Clinic with Dr. Crocker as scheduled    Disposition: Pending progress with therapies, pain control on orals, and medical stability, anticipate discharge to Home vs Rehab on POD #3-4.    Edmundo Egan MD PGY 4  Orthopaedic Surgery

## 2025-06-30 NOTE — ANESTHESIA CARE TRANSFER NOTE
Patient: Bear Woman Stately    Procedure: Procedure(s):  Anterior Cervical Disc arthroplasty cervical 5-6 and 6-7.       Diagnosis: Cervical disc disorder at C5-C6 level with radiculopathy [M50.122]  Cervical disc disorder at C6-C7 level with radiculopathy [M50.123]  DDD (degenerative disc disease), cervical [M50.30]  Diagnosis Additional Information: No value filed.    Anesthesia Type:   General     Note:    Oropharynx: spontaneously breathing  Level of Consciousness: drowsy  Oxygen Supplementation: face mask  Level of Supplemental Oxygen (L/min / FiO2): 6  Independent Airway: airway patency satisfactory and stable  Dentition: dentition unchanged  Vital Signs Stable: post-procedure vital signs reviewed and stable  Report to RN Given: handoff report given  Patient transferred to: PACU    Handoff Report: Identifed the Patient, Identified the Reponsible Provider, Reviewed the pertinent medical history, Discussed the surgical course, Reviewed Intra-OP anesthesia mangement and issues during anesthesia, Set expectations for post-procedure period and Allowed opportunity for questions and acknowledgement of understanding      Vitals:  Vitals Value Taken Time   /76 06/30/25 17:48   Temp     Pulse 73 06/30/25 17:51   Resp     SpO2 97 % 06/30/25 17:51   Vitals shown include unfiled device data.    Electronically Signed By: LUIS Irvin CRNA  June 30, 2025  5:51 PM

## 2025-07-01 ENCOUNTER — APPOINTMENT (OUTPATIENT)
Dept: GENERAL RADIOLOGY | Facility: CLINIC | Age: 45
End: 2025-07-01
Attending: ORTHOPAEDIC SURGERY
Payer: MEDICARE

## 2025-07-01 VITALS
DIASTOLIC BLOOD PRESSURE: 70 MMHG | OXYGEN SATURATION: 98 % | WEIGHT: 167.77 LBS | HEART RATE: 79 BPM | HEIGHT: 71 IN | BODY MASS INDEX: 23.49 KG/M2 | SYSTOLIC BLOOD PRESSURE: 104 MMHG | RESPIRATION RATE: 16 BRPM | TEMPERATURE: 98.4 F

## 2025-07-01 PROBLEM — M50.920 CERVICAL DISC DISORDER OF MID-CERVICAL REGION: Status: ACTIVE | Noted: 2025-07-01

## 2025-07-01 LAB
ANION GAP SERPL CALCULATED.3IONS-SCNC: 11 MMOL/L (ref 7–15)
BUN SERPL-MCNC: 12.6 MG/DL (ref 6–20)
CALCIUM SERPL-MCNC: 8.4 MG/DL (ref 8.8–10.4)
CHLORIDE SERPL-SCNC: 105 MMOL/L (ref 98–107)
CREAT SERPL-MCNC: 0.66 MG/DL (ref 0.51–0.95)
EGFRCR SERPLBLD CKD-EPI 2021: >90 ML/MIN/1.73M2
GLUCOSE SERPL-MCNC: 146 MG/DL (ref 70–99)
GLUCOSE SERPL-MCNC: 146 MG/DL (ref 70–99)
HCO3 SERPL-SCNC: 23 MMOL/L (ref 22–29)
HGB BLD-MCNC: 12.7 G/DL (ref 11.7–15.7)
MCV RBC AUTO: 97 FL (ref 78–100)
POTASSIUM SERPL-SCNC: 4.6 MMOL/L (ref 3.4–5.3)
SODIUM SERPL-SCNC: 139 MMOL/L (ref 135–145)

## 2025-07-01 PROCEDURE — 97165 OT EVAL LOW COMPLEX 30 MIN: CPT | Mod: GO

## 2025-07-01 PROCEDURE — 36415 COLL VENOUS BLD VENIPUNCTURE: CPT

## 2025-07-01 PROCEDURE — G0378 HOSPITAL OBSERVATION PER HR: HCPCS

## 2025-07-01 PROCEDURE — 85018 HEMOGLOBIN: CPT

## 2025-07-01 PROCEDURE — 250N000013 HC RX MED GY IP 250 OP 250 PS 637

## 2025-07-01 PROCEDURE — 96376 TX/PRO/DX INJ SAME DRUG ADON: CPT

## 2025-07-01 PROCEDURE — 72040 X-RAY EXAM NECK SPINE 2-3 VW: CPT | Mod: 26 | Performed by: STUDENT IN AN ORGANIZED HEALTH CARE EDUCATION/TRAINING PROGRAM

## 2025-07-01 PROCEDURE — 999N000147 HC STATISTIC PT IP EVAL DEFER

## 2025-07-01 PROCEDURE — 97535 SELF CARE MNGMENT TRAINING: CPT | Mod: GO

## 2025-07-01 PROCEDURE — 97530 THERAPEUTIC ACTIVITIES: CPT | Mod: GO

## 2025-07-01 PROCEDURE — 250N000013 HC RX MED GY IP 250 OP 250 PS 637: Performed by: NURSE PRACTITIONER

## 2025-07-01 PROCEDURE — 999N000065 XR CERVICAL SPINE 2/3 VIEWS

## 2025-07-01 PROCEDURE — 250N000011 HC RX IP 250 OP 636: Mod: JW

## 2025-07-01 PROCEDURE — 250N000011 HC RX IP 250 OP 636

## 2025-07-01 PROCEDURE — 80048 BASIC METABOLIC PNL TOTAL CA: CPT

## 2025-07-01 RX ORDER — LIDOCAINE 4 G/G
3 PATCH TOPICAL
Status: DISCONTINUED | OUTPATIENT
Start: 2025-07-01 | End: 2025-07-01 | Stop reason: HOSPADM

## 2025-07-01 RX ORDER — AMOXICILLIN 250 MG
1 CAPSULE ORAL 2 TIMES DAILY
Qty: 25 TABLET | Refills: 0 | Status: SHIPPED | OUTPATIENT
Start: 2025-07-01

## 2025-07-01 RX ORDER — VALACYCLOVIR HYDROCHLORIDE 500 MG/1
500 TABLET, FILM COATED ORAL 2 TIMES DAILY
Status: DISCONTINUED | OUTPATIENT
Start: 2025-07-01 | End: 2025-07-01 | Stop reason: HOSPADM

## 2025-07-01 RX ORDER — POLYETHYLENE GLYCOL 3350 17 G/17G
17 POWDER, FOR SOLUTION ORAL DAILY
Qty: 510 G | Refills: 0 | Status: SHIPPED | OUTPATIENT
Start: 2025-07-01

## 2025-07-01 RX ORDER — BACLOFEN 20 MG/1
20 TABLET ORAL 3 TIMES DAILY
Status: DISCONTINUED | OUTPATIENT
Start: 2025-07-01 | End: 2025-07-01 | Stop reason: HOSPADM

## 2025-07-01 RX ORDER — LEFLUNOMIDE 20 MG/1
20 TABLET ORAL EVERY MORNING
Status: SHIPPED
Start: 2025-07-04

## 2025-07-01 RX ORDER — GABAPENTIN 300 MG/1
900 CAPSULE ORAL 3 TIMES DAILY
Status: DISCONTINUED | OUTPATIENT
Start: 2025-07-01 | End: 2025-07-01 | Stop reason: HOSPADM

## 2025-07-01 RX ORDER — METHOCARBAMOL 1000 MG/1
1000 TABLET, FILM COATED ORAL EVERY 6 HOURS PRN
Qty: 35 TABLET | Refills: 0 | Status: SHIPPED | OUTPATIENT
Start: 2025-07-01

## 2025-07-01 RX ORDER — SUCRALFATE 1 G/1
1 TABLET ORAL AT BEDTIME
Status: DISCONTINUED | OUTPATIENT
Start: 2025-07-01 | End: 2025-07-01 | Stop reason: HOSPADM

## 2025-07-01 RX ORDER — OXYCODONE HYDROCHLORIDE 10 MG/1
10-15 TABLET ORAL EVERY 4 HOURS PRN
Qty: 42 TABLET | Refills: 0 | Status: SHIPPED | OUTPATIENT
Start: 2025-07-01

## 2025-07-01 RX ORDER — VALACYCLOVIR HYDROCHLORIDE 1 G/1
1000 TABLET, FILM COATED ORAL DAILY
Status: DISCONTINUED | OUTPATIENT
Start: 2025-07-01 | End: 2025-07-01

## 2025-07-01 RX ADMIN — SUCRALFATE 1 G: 1 TABLET ORAL at 01:13

## 2025-07-01 RX ADMIN — DOCUSATE SODIUM AND SENNOSIDES 1 TABLET: 8.6; 5 TABLET, FILM COATED ORAL at 07:57

## 2025-07-01 RX ADMIN — ACETAMINOPHEN 975 MG: 325 TABLET ORAL at 04:55

## 2025-07-01 RX ADMIN — OXYCODONE HYDROCHLORIDE 15 MG: 10 TABLET ORAL at 10:06

## 2025-07-01 RX ADMIN — METHOCARBAMOL 1000 MG: 500 TABLET ORAL at 05:39

## 2025-07-01 RX ADMIN — GABAPENTIN 300 MG: 300 CAPSULE ORAL at 07:58

## 2025-07-01 RX ADMIN — OXYCODONE HYDROCHLORIDE 15 MG: 10 TABLET ORAL at 05:39

## 2025-07-01 RX ADMIN — VALACYCLOVIR HYDROCHLORIDE 500 MG: 500 TABLET, FILM COATED ORAL at 01:13

## 2025-07-01 RX ADMIN — BACLOFEN 20 MG: 20 TABLET ORAL at 13:37

## 2025-07-01 RX ADMIN — ACETAMINOPHEN 975 MG: 325 TABLET ORAL at 13:37

## 2025-07-01 RX ADMIN — BACLOFEN 20 MG: 20 TABLET ORAL at 01:13

## 2025-07-01 RX ADMIN — HYDROMORPHONE HYDROCHLORIDE 0.6 MG: 1 INJECTION, SOLUTION INTRAMUSCULAR; INTRAVENOUS; SUBCUTANEOUS at 07:10

## 2025-07-01 RX ADMIN — HYDROMORPHONE HYDROCHLORIDE 0.6 MG: 1 INJECTION, SOLUTION INTRAMUSCULAR; INTRAVENOUS; SUBCUTANEOUS at 04:55

## 2025-07-01 RX ADMIN — OXYCODONE HYDROCHLORIDE 15 MG: 10 TABLET ORAL at 14:46

## 2025-07-01 RX ADMIN — LIDOCAINE 4% 3 PATCH: 40 PATCH TOPICAL at 10:07

## 2025-07-01 RX ADMIN — GABAPENTIN 300 MG: 300 CAPSULE ORAL at 13:37

## 2025-07-01 RX ADMIN — BACLOFEN 20 MG: 20 TABLET ORAL at 07:57

## 2025-07-01 RX ADMIN — HYDROXYZINE HYDROCHLORIDE 25 MG: 25 TABLET, FILM COATED ORAL at 08:05

## 2025-07-01 RX ADMIN — HYDROMORPHONE HYDROCHLORIDE 0.6 MG: 1 INJECTION, SOLUTION INTRAMUSCULAR; INTRAVENOUS; SUBCUTANEOUS at 02:51

## 2025-07-01 RX ADMIN — HYDROMORPHONE HYDROCHLORIDE 0.6 MG: 1 INJECTION, SOLUTION INTRAMUSCULAR; INTRAVENOUS; SUBCUTANEOUS at 09:22

## 2025-07-01 RX ADMIN — VALACYCLOVIR HYDROCHLORIDE 500 MG: 500 TABLET, FILM COATED ORAL at 07:57

## 2025-07-01 RX ADMIN — CEFAZOLIN 1 G: 1 INJECTION, POWDER, FOR SOLUTION INTRAMUSCULAR; INTRAVENOUS at 06:42

## 2025-07-01 RX ADMIN — POLYETHYLENE GLYCOL 3350 17 G: 17 POWDER, FOR SOLUTION ORAL at 07:57

## 2025-07-01 RX ADMIN — FAMOTIDINE 20 MG: 20 TABLET, FILM COATED ORAL at 07:57

## 2025-07-01 RX ADMIN — GABAPENTIN 300 MG: 300 CAPSULE ORAL at 01:13

## 2025-07-01 ASSESSMENT — ACTIVITIES OF DAILY LIVING (ADL)
ADLS_ACUITY_SCORE: 64
ADLS_ACUITY_SCORE: 43
ADLS_ACUITY_SCORE: 64
ADLS_ACUITY_SCORE: 64
ADLS_ACUITY_SCORE: 43
ADLS_ACUITY_SCORE: 64
ADLS_ACUITY_SCORE: 43
ADLS_ACUITY_SCORE: 64

## 2025-07-01 NOTE — PLAN OF CARE
Goal Outcome Evaluation:      Plan of Care Reviewed With: patient    Overall Patient Progress: improving    VS: VSS. Denies CP/SOB. A/O x4.   O2: >90% on RA    Output: Hdz removed this morning. Voiding adequate amounts w/o pain or difficulty    Last BM: 6/30   Activity: Up with assist of 1/SBA, GB and walker. WBAT. Cleared by PT for discharge- no walker    Pain: Pain managing with PRNs and scheduled meds. Heat packs   CMS: Intact to baseline. New numbness in R 2 fingers- Ortho notified,. Improved later in the shift.     Diet: Regular, tolerating well.    LDA: PIV removed before discharge   Additional Info:      DISCHARGE SUMMARY    Pt discharging to: Home  Transportation: family, car  AVS given and discussed: Yes, no further questions.   Stoplight Tool given and discussed: Yes, no further questions.  Medications given: Yes, discussed. No further questions.   Belongings returned: Yes, ensured all belongings packed and sent with pt. No items in security.   Comments: Escorted safely to elevators.

## 2025-07-01 NOTE — PROGRESS NOTES
"Orthopaedic Surgery Progress Note:       Subjective:   Reported of pain near the surgical site, and back of neck. Overall, reports the neck pain is better and different from the pre op pain. Pain controlled on current regimen. Tolerating solids. No N/V. Denies CP/SOB/F.    Patient expresses frustration when I mention our recommendation to hold Avara x 5 days post op stating, \"I have been off of it in the past\" for up to 10 days and felt \"like I got hit by a truck.\" She also expressed frustration that we would recommend approximately 24 hours off of IV meds before discharge. She states she wants to go home.     Objective:   /59   Pulse 82   Temp 97.7  F (36.5  C) (Oral)   Resp 16   Ht 1.803 m (5' 11\")   Wt 76.1 kg (167 lb 12.3 oz)   SpO2 94%   BMI 23.40 kg/m    I/O this shift:  In: 480 [P.O.:480]  Out: 1430 [Urine:1425; Drains:5]  Gen: NAD. Sleeping upon arrival, lying comfortably in bed  Resp: Breathing comfortably on RA  : Hdz removed.     Dressings clean and dry  Drain in place and maintaining suction     Cervical spine:    Appearance -no gross step-offs, kyphosis.    Motor - (All effort limited)    C5: Deltoids R 5/5 and L 5/5 strength    C6: Biceps R 5/5 and L 5/5 strength     C7: Triceps R 5/5 and L 5/5 strength     C8:  R 5/5 and L 5/5 strength     T1: Dorsal interossei R 5/5 and L 5/5 strength        Sensation: intact to light touch in C5-T1        Labs:  Lab Results   Component Value Date    WBC 13.4 (H) 03/12/2021    HGB 12.7 07/01/2025     03/12/2021        Assessment & Plan:   Assessment and Plan: Kevyn Dillon is a 45 year old female s/p C5-C7 arthroplasty.on 6/30/2025.    XR completed, drain removed by APRN author.    I recommend she hold her Avara per Dr. Crocker's recommendation. However, if she were to start her Avara sooner, I doubt it would have detrimental effect.  I explain that we recommend she stay inpatient tonight until we know the PO regimen is effective " enough without IV, but she again requests to leave.  I write her orders and sent her medications to her home pharmacy per her request.     Ortho (Dr. Crocker) Primary  Activity:   - Up with assist. No excessive bending or twisting. No lifting >10 lbs x 6 weeks.     Weight bearing status: WBAT.  Pain management:   - Transition from PCA/i.v. to PO narcotics as tolerated. Okay with NSAIDS.  Antibiotics: Ancef x 48 hours, or until the drain is in- whichever is earlier.  Diet: As tolerated  DVT prophylaxis: SCDs only. No chemical DVT ppx needed.  Imaging: XR C-spine AP and lateral today- completed.   Bracing/Splinting: Soft collar for comfort  Dressings: Can remove dressing on POD-2. Do not remove steri-strips. Steri strips would fall of by itself.  Drains: removed.   Hdz catheter: Remove  Physical Therapy/Occupational Therapy: Eval and treat today.  Follow-up: Clinic with Dr. Crocker in 6 weeks with repeat x-rays.   Disposition: Pending progress with therapies, pain control on orals, and medical stability, anticipate discharge later today.    Yoan Hanson, APRN

## 2025-07-01 NOTE — ANESTHESIA POSTPROCEDURE EVALUATION
Patient: Bear Woman Stately    Procedure: Procedure(s):  Anterior Cervical Disc arthroplasty cervical 5-6 and 6-7.       Anesthesia Type:  General    Note:  Disposition: Inpatient   Postop Pain Control: Uneventful            Sign Out: Well controlled pain   PONV: No   Neuro/Psych: Uneventful            Sign Out: Acceptable/Baseline neuro status   Airway/Respiratory: Uneventful            Sign Out: Acceptable/Baseline resp. status   CV/Hemodynamics: Uneventful            Sign Out: Acceptable CV status; No obvious hypovolemia; No obvious fluid overload   Other NRE: NONE   DID A NON-ROUTINE EVENT OCCUR? No           Last vitals:  Vitals Value Taken Time   BP 94/79 06/30/25 19:00   Temp 36.9  C (98.4  F) 06/30/25 17:48   Pulse 79 06/30/25 19:08   Resp 12 06/30/25 19:08   SpO2 95 % 06/30/25 19:08   Vitals shown include unfiled device data.    Electronically Signed By: Latha Gregory MD  June 30, 2025  7:09 PM

## 2025-07-01 NOTE — PLAN OF CARE
"5 Ortho Admission Note    Reason for admission: Cervical spine surgery   Primary team notified of pt arrival.  Admitted from: PACU  Via: Cart   Accompanied by: Several family members  Belongings: Placed in closet; valuables sent home with family  Admission Required Doc Completed: No  Mobility Devices Utilized by Patient Provided (i.e. walker, wheelchair, etc.): Yes  Teaching: Orientation to unit and call light- call light within reach, use of console, meal times, when to call for the RN, and enforced importance of safety.  IV Access: R PIV  Telemetry: No  Ht./Wt.: Completed  Code Status verified on armband: Yes  2 RN Skin Assessment Completed with: Pt refused full skin assessment   Suction/Ambu bag/Flowmeter at bedside: Yes    Pt status:     Temp:  [97.7  F (36.5  C)-98.4  F (36.9  C)] 97.7  F (36.5  C)  Pulse:  [59-82] 72  Resp:  [14-19] 16  BP: ()/(60-90) 101/60  SpO2:  [89 %-98 %] 95 %    Goal Outcome Evaluation:         VS: /60   Pulse 72   Temp 97.7  F (36.5  C) (Oral)   Resp 16   Ht 1.803 m (5' 11\")   Wt 76.1 kg (167 lb 12.3 oz)   SpO2 95%   BMI 23.40 kg/m      O2: > 92% on RA, denies SOB and CP  Pt refused CAPNO   Output: Hdz in place, draining adequately, remove on POD 1   Last BM:    Activity: Ax1 with walker and GB  Pt is not OOB yet   Skin: Pt refused full skin assessment   Anterior cervical neck incision with SINDHU drain    Pain: Reports 8-10/10 pain in neck and back    CMS: AO x4, reports numbness and tingling to BLE (baseline)    Dressing: CDI   SINDHU drain   Soft collar    Diet: Regular    LDA: R PIV    Equipment: IV pole, call light, walker, gait belt and personal belongings   Plan: Continue POC   Additional Info:                          "

## 2025-07-01 NOTE — PROGRESS NOTES
"Orthopaedic Surgery Progress Note:       Subjective:   Reported of pain near the surgical site, and back of neck. Overall, reports the neck pain is better and different from the pre op pain. Pain controlled on current regimen. Tolerating solids. No N/V. Denies CP/SOB/F.    Objective:   BP 98/59 (BP Location: Left arm)   Pulse 82   Temp 97.7  F (36.5  C) (Oral)   Resp 16   Ht 1.803 m (5' 11\")   Wt 76.1 kg (167 lb 12.3 oz)   SpO2 94%   BMI 23.40 kg/m    I/O this shift:  In: -   Out: 805 [Urine:800; Drains:5]  Gen: NAD. Resting comfortably in bed  Resp: Breathing comfortably on RA  : Hdz in place    Dressings clean and dry  Drain in place and maintaining suction     Cervical spine:    Appearance -no gross step-offs, kyphosis.    Motor - (All effort limited)    C5: Deltoids R 5/5 and L 5/5 strength    C6: Biceps R 5/5 and L 5/5 strength     C7: Triceps R 5/5 and L 5/5 strength     C8:  R 5/5 and L 5/5 strength     T1: Dorsal interossei R 5/5 and L 5/5 strength        Sensation: intact to light touch in C5-T1        Labs:  Lab Results   Component Value Date    WBC 13.4 (H) 03/12/2021    HGB 12.7 07/01/2025     03/12/2021        Assessment & Plan:   Assessment and Plan: Kevyn Woman Stately is a 45 year old female s/p C5-C7 arthroplasty.on 6/30/2025.    Ortho (Dr. Crocker) Primary  Activity:   - Up with assist. No excessive bending or twisting. No lifting >10 lbs x 6 weeks.     Weight bearing status: WBAT.  Pain management:   - Transition from PCA/i.v. to PO narcotics as tolerated. Okay with NSAIDS.  Antibiotics: Ancef x 48 hours, or until the drain is in- whichever is earlier.  Diet: As tolerated  DVT prophylaxis: SCDs only. No chemical DVT ppx needed.  Imaging: XR C-spine AP and lateral today  Bracing/Splinting: Soft collar for comforts  Dressings: Can remove dressing on POD-2. Do not remove steri-strips. Steri strips would fall of by itself..  Drains: Potential removal today  Hdz catheter: " Remove  Physical Therapy/Occupational Therapy: Eval and treat today.  Follow-up: Clinic with Dr. Crocker in 6 weeks with repeat x-rays.   Disposition: Pending progress with therapies, pain control on orals, and medical stability, anticipate discharge later today or tomorrow.    Eben Day  Spine Fellow, PGY5 Neurosurgery

## 2025-07-01 NOTE — PROGRESS NOTES
[facility]                                                                                   OUTPATIENT OCCUPATIONAL THERAPY    PLAN OF TREATMENT FOR OUTPATIENT REHABILITATION   Patient's Last Name, First Name, Kevyn Monzon YOB: 1980   Provider's Name   [facility]   Medical Record No.  [id number]     Onset Date: 06/30/25 Start of Care Date: 07/02/25     Medical Diagnosis:  Spinal surgery               OT Diagnosis:  Decreased I/ADL IND Certification Dates:  From: 07/02/25  To: 07/02/25     See note for plan of treatment, functional goals, and certification details.    I CERTIFY THE NEED FOR THESE SERVICES FURNISHED UNDER        THIS PLAN OF TREATMENT AND WHILE UNDER MY CARE (Physician co-signature of this document indicates review and certification of the therapy plan).                        Occupational Therapy Evaluation 07/01/25 1000   Appointment Info   Signing Clinician's Name / Credentials (OT) Marti Correa OTR/L   Rehab Comments (OT) c-spine   Living Environment   Current Living Arrangements house   Number of Stairs, Within Home, Primary five   Living Environment Comments Pt has multiple children who can A her prn. Tub shower w/ GB.   Self-Care   Usual Activity Tolerance good   Current Activity Tolerance moderate   Equipment Currently Used at Home none   Fall history within last six months no   Activity/Exercise/Self-Care Comment Pt is IND w/ I/ADLs and mobitiy at baseline. D/t hx of surgeries, pt owns multiple pieces of AE (shower chair, RTS etc)   General Information   Onset of Illness/Injury or Date of Surgery 06/30/25   Referring Physician Nir Crocker MD   Patient/Family Therapy Goal Statement (OT) To d/c home   Additional Occupational Profile Info/Pertinent History of Current Problem Kevyn Dillon is a 45 year old female s/p C5-C7 arthroplasty.on 6/30/2025.   Existing Precautions/Restrictions  spinal   Left Upper Extremity (Weight-bearing Status) partial weight-bearing (PWB)   Right Upper Extremity (Weight-bearing Status) partial weight-bearing (PWB)   Cognitive Status Examination   Orientation Status orientation to person, place and time   Follows Commands WNL   Pain Assessment   Patient Currently in Pain Yes, see Vital Sign flowsheet   Range of Motion Comprehensive   General Range of Motion no range of motion deficits identified   Strength Comprehensive (MMT)   General Manual Muscle Testing (MMT) Assessment no strength deficits identified   Comment, General Manual Muscle Testing (MMT) Assessment Not formally tested but observed to be WNL   Coordination   Upper Extremity Coordination No deficits were identified   Bed Mobility   Bed Mobility sit-supine;supine-sit   Supine-Sit Summers (Bed Mobility) supervision   Sit-Supine Summers (Bed Mobility) minimum assist (75% patient effort)   Transfers   Transfers toilet transfer   Toilet Transfer   Type (Toilet Transfer) stand pivot/stand step   Summers Level (Toilet Transfer) supervision   Balance   Balance Assessment no deficits identified   Activities of Daily Living   BADL Assessment/Intervention lower body dressing;toileting   Lower Body Dressing Assessment/Training   Summers Level (Lower Body Dressing) modified independence   Toileting   Summers Level (Toileting) toileting skills;adjust/manage clothing;bridging;independent   Clinical Impression   Criteria for Skilled Therapeutic Interventions Met (OT) Yes, treatment indicated   OT Diagnosis Decreased I/ADL IND   OT Problem List-Impairments impacting ADL problems related to;activity tolerance impaired;post-surgical precautions;pain   Assessment of Occupational Performance 1-3 Performance Deficits   Identified Performance Deficits IADLs, bathing   Planned Therapy Interventions (OT) ADL retraining;IADL retraining;progressive activity/exercise   Clinical Decision Making Complexity (OT)  problem focused assessment/low complexity   Risk & Benefits of therapy have been explained care plan/treatment goals reviewed;risks/benefits reviewed;evaluation/treatment results reviewed;current/potential barriers reviewed;participants voiced agreement with care plan;participants included;patient   OT Total Evaluation Time   OT Eval, Low Complexity Minutes (85088) 10   OT Goals   Therapy Frequency (OT) One time eval and treatment   OT Predicted Duration/Target Date for Goal Attainment 07/01/25   OT Goals Lower Body Dressing;Bed Mobility;Toilet Transfer/Toileting;Aerobic Activity   OT: Lower Body Dressing Modified independent   OT: Bed Mobility Modified independent   OT: Toilet Transfer/Toileting Modified independent   OT: Perform aerobic activity with stable cardiovascular response continuous activity;5 minutes;ambulation   Interventions   Interventions Quick Adds Therapeutic Activity   Self-Care/Home Management   Self-Care/Home Mgmt/ADL, Compensatory, Meal Prep Minutes (87014) 9   Treatment Detail/Skilled Intervention Pt greeted supine in bed and reporting need to utilize the BR. Edu on spinal precautions and implications for ADLs and mobility. Also edu on logroll although pt reporting this increases pain. Pt ultimately mod I w/o technique for supine > sit. CGA STS and amb to/from BR w/ use of GB for lowering onto toilet. REviewed AE needs for home w/ pt reporting current AE (see above). Pt demoed figure four during session.   Therapeutic Activities   Therapeutic Activity Minutes (44431) 9   Treatment Detail/Skilled Intervention Facilitated amb of household distnace for progression of tolerance for I/ADLs. Pt amb ~500ft initially w/ SBA and progressing to IND w/o device. Slow but steady on feet. Reviewed OT goals and progression w/ pt in agreement that OT goals have been met   OT Discharge Planning   OT Plan DC OT   OT Discharge Recommendation (DC Rec) home with assist   OT Rationale for DC Rec Pt mobilizing well  and has AE for home to A w/ ADLs as needed. Anticipate pt is safe to d/c home w/ A from family for heavy IADLs   OT Brief overview of current status IND   OT Total Distance Amb During Session (feet) 510   Total Session Time   Timed Code Treatment Minutes 18   Total Session Time (sum of timed and untimed services) 28

## 2025-07-01 NOTE — PLAN OF CARE
Physical Therapy: Orders received. Chart reviewed and discussed with care team.? Physical Therapy not indicated due to pt progressed to ind amb w/out assistive device w/ OT evaluation. Pt juan josé w/ bed mob and has equipment needed at home already, pt has family to assist as needed once home.? Defer discharge recommendations to OT and medical team.? Will complete orders.

## 2025-07-02 NOTE — DISCHARGE SUMMARY
ORTHOPAEDIC SURGERY DISCHARGE SUMMARY     Date of Admission: 6/30/2025  Date of Discharge: 7/1/2025  4:45 PM  Disposition: Home  Staff Physician: Esperanza att. providers found  Primary Care Provider: [facility]    DISCHARGE DIAGNOSIS:  Cervical disc disorder at C5-C6 level with radiculopathy [M50.122]  Cervical disc disorder at C6-C7 level with radiculopathy [M50.123]  DDD (degenerative disc disease), cervical [M50.30]    PROCEDURES: Procedure(s):  Anterior Cervical Disc arthroplasty cervical 5-6 and 6-7. on 6/30/2025    BRIEF HISTORY:  This is 45 year old year old patient who has been followed in clinic. Please refer to that documentation for full details. Briefly, the patient has a history of cervical radiculopathy. The patient has failed conservative treatment of symptoms and desires more definitive intervention. Therefore, after reviewing non-operative and operative options including the risks and benefits associated with each, the patient elected to proceed with the above stated procedure.     HOSPITAL COURSE:    The patient was admitted following the above listed procedures for pain control and rehabilitation. Kevyn Stuart Stateestrell[facility] did well post-operatively. Medicine was consulted post operatively to aid in management of medical co-morbidities. The patient received routine nursing cares and at the time of discharge was medically stable. Vital signs were stable throughout admission. The patient is tolerating a regular diet and is voiding spontaneously. All PT/OT goals have been met for safe mobility. Pain is now controlled on oral medications which will be available on discharge. Stool softeners have been used while taking pain medications to help prevent constipation. Kevyn Dillon is deemed medically safe to discharge.     Antibiotics:  Ancef given periop and 24 hours postop.  DVT prophylaxis:  mechanical  PT Progress:  Has met PT/OT goals for safe mobility.    Pain Meds:  Weaned off all  IV pain meds by discharge.  Inpatient Events: No significant events or complications.     PHYSICAL EXAM:    Gen: NAD. Sleeping upon arrival, lying comfortably in bed  Resp: Breathing comfortably on RA  : Hdz removed.      Dressings clean and dry  Drain in place and maintaining suction      Cervical spine:                          Appearance -no gross step-offs, kyphosis.                          Motor - (All effort limited)                          C5: Deltoids R 5/5 and L 5/5 strength                          C6: Biceps R 5/5 and L 5/5 strength                           C7: Triceps R 5/5 and L 5/5 strength                           C8:  R 5/5 and L 5/5 strength                           T1: Dorsal interossei R 5/5 and L 5/5 strength                               Sensation: intact to light touch in C5-T1    FOLLOWUP:    Follow up with Dr. Crocker at 2 weeks postoperatively.    Future Appointments   Date Time Provider Department Center   8/14/2025 10:00 AM Nir Crocker MD [facility]       Orthopaedic Surgery appointments are at the [facility] [place] ([address] [place] [address]). Call [phone] to schedule a follow-up appointment at this location with your provider.     PLANNED DISCHARGE ORDERS:     DVT Prophylaxis: Mechanical     Activity: WBAT      Wound Care: see Below      Discharge Medication List as of 7/1/2025  3:46 PM        START taking these medications    Details   methocarbamol 1000 MG TABS Take 1,000 mg by mouth every 6 hours as needed for muscle spasms., Disp-35 tablet, R-0, E-Prescribe      naloxone (NARCAN) 4 MG/0.1ML nasal spray Spray 1 spray (4 mg) into one nostril alternating nostrils as needed for opioid reversal. every 2-3 minutes until assistance arrives, Disp-2 each, R-0, E-Prescribe      oxyCODONE IR (ROXICODONE) 10 MG tablet Take 1-1.5 tablets (10-15 mg) by mouth every 4 hours as needed for moderate to severe pain or breakthrough pain.,  Disp-42 tablet, R-0, E-Prescribe      polyethylene glycol (MIRALAX) 17 GM/Dose powder Take 17 g by mouth daily., Disp-510 g, R-0, E-Prescribe      senna-docusate (SENOKOT-S/PERICOLACE) 8.6-50 MG tablet Take 1 tablet by mouth 2 times daily., Disp-25 tablet, R-0, E-Prescribe           CONTINUE these medications which have CHANGED    Details   leflunomide (ARAVA) 20 MG tablet Take 1 tablet (20 mg) by mouth every morning. Your surgical team recommends you do not take this medication until 5 days post op., No Print Out           CONTINUE these medications which have NOT CHANGED    Details   acetaminophen (TYLENOL) 500 MG tablet Take 1,000 mg by mouth, Historical      baclofen (LIORESAL) 10 MG tablet 20 mg 3 times daily , Historical      cholecalciferol 25 MCG (1000 UT) TABS Take 50 mcg by mouth every morning., Historical      cyanocobalamin (VITAMIN B-12) 500 MCG tablet Take 500 mcg by mouth every morning., Historical      etanercept (ENBREL) 50 MG/ML injection Inject 50 mg subcutaneously once a week., HistoricalPaused since today. Resumes on Mon 7/14/2025.      fexofenadine (ALLEGRA) 180 MG tablet Take 180 mg by mouth every morning., Historical      fluticasone (FLONASE) 50 MCG/ACT nasal spray Spray 2 sprays in nostril, Historical      gabapentin (NEURONTIN) 300 MG capsule Take 900 mg by mouth 3 times daily , Historical      levothyroxine (SYNTHROID/LEVOTHROID) 25 MCG tablet Take 25 mcg by mouth every morning (before breakfast)., Historical      lidocaine (LIDODERM) 5 % patch Place 1 patch onto the skinHistorical      pantoprazole (PROTONIX) 40 MG EC tablet Take 40 mg by mouth every morning (before breakfast)., Historical      sucralfate (CARAFATE) 1 GM tablet Take 1 g by mouth at bedtime., Historical      !! valACYclovir (VALTREX) 1000 mg tablet TAKE 2 TABLETS(2000 MG) BY MOUTH TWICE DAILY AS NEEDED FOR COLD SORES, Historical      !! valACYclovir (VALTREX) 500 MG tablet Take 500 mg by mouth 2 times daily., Historical  "     varenicline (CHANTIX) 1 MG tablet Take 1 mg by mouth 2 times daily., Historical       !! - Potential duplicate medications found. Please discuss with provider.        STOP taking these medications       oxyCODONE-acetaminophen (PERCOCET)  MG per tablet Comments:   Reason for Stopping:                 Discharge Procedure Orders   Reason for your hospital stay   Order Comments: Anterior cervical fusion     Brief Discharge Instructions   Order Comments: Post-operative Discharge Instructions:     WOUND CARE:  You have a dressing on your incision which can be worn for up to 3 days, and it can be worn in the shower. After the dressing has been removed, you do not need a dressing. There is \"surgical glue\" directly over the incision. This will fall off on its own, which can take up to 2 weeks. It is okay to shower and wash gently with soap and water. Do not soak in the bath. No pools, hot tubs, or lakes for 6 weeks.      After surgery, you may have a sensitive scar.  When the incision has fully healed, you may massage the scar to decrease sensitivity and help break down scar tissue. Do this up to 4 times per day.     DIET & EXERCISE:  - When you get home, you may resume your normal diet as tolerated. You may not be very hungry but try to eat small healthy meals to help you heal. Remember to drink plenty of water/fluids to help keep you hydrated.     - You will be seen in the clinic at 6 weeks following surgery. You will not need to attend physical therapy during this time. You can focus on cardiovascular fitness by walking as much as you can tolerate. Avoid bending, lifting, and twisting. Your weight lifting restriction is 10 pounds until your first follow-up appointment.       PAIN MEDICATIONS:  For postoperative pain control, we have prescribed a variety of medications. Tylenol has been prescribed and should be taken as part of the complete pain management regimen. You may also have been prescribed a muscle " relaxer to be taken as needed for muscle spasms. Other pain management techniques include icing the surgical area (for 15 minutes on, 15 minutes off) throughout the day to help with inflammation. Avoid NSAIDs (ibuprofen, Aleve, Advil, etc) for the first 12 weeks after surgery, unless approved by your surgeon.     You also received a prescription for a opioid medication.  This should be taken for the first few weeks following surgery.  As pain improves, decrease the amount you take (see tapering plan below). Opioid have numerous side effects including nausea, constipation, and drowsiness. If you experience nausea, this may be relieved by taking the medication with food or a light meal. To avoid constipation, please use an over-the-counter stool softeners and drink lots of water and eat fruits and vegetables. No operating heavy machinery or driving an automobile while on opioid medications.        Tapering opioids: As your pain symptoms improve, focus your efforts on decreasing (tapering) use of opioid medications. The most successful tapering strategy is to first, decrease the number of tablets you take every 4-6 hours to the minimum prescribed. Then, increase the amount of time between doses.  For example:  First, taper to   or 1 tablet every 4-6 hours.  Then, taper to   or 1 tablet every 6-8 hours.  Then, taper to   or 1 tablet every 8-10 hours.  Then, taper to   or 1 tablet every 10-12 hours.  Then, taper to   or 1 tablet at bedtime.  The bedtime dose can help with comfort during sleep and is typically the last dose to be discontinued after surgery.     Call the orthopedic clinic at [phone] several business days in advance of when you need a refill. Early refills will not be provided, and you may not take more than what is prescribed. Inform the nurse how much of the medication you are taking and how many tablets you have left.     WHEN TO CALL:  Please call or return if you experience the following:  -  "Fevers (temperature greater than 100.4 degrees Fahrenheit).  - Pain that is getting worse or does not respond to pain medications.  - Drainage from your wound.  - Increasing redness about the wound.  - Changes in strength or sensation to your arms/legs.   - Any other worrisome symptoms.     You may reach the clinic by dialing [phone].  After hours, you may reach the resident on call by dialing [phone].      When to call - Contact Surgeon Team   Order Comments: You may experience symptoms that require follow-up before your scheduled appointment. Refer to the \"Stoplight Tool\" for instructions on when to contact your Surgeon Team if you are concerned about pain control, blood clots, constipation, or if you are unable to urinate.     When to call - Reach out to Urgent Care   Order Comments: If you are not able to reach your Surgeon Team and you need immediate care, go to the Orthopedic Walk-in Clinic or Urgent Care at your Surgeon's office.  Do NOT go to the Emergency Room unless you have shortness of breath, chest pain, or other signs of a medical emergency.     When to call - Reasons to Call 911   Order Comments: Call 911 immediately if you experience sudden-onset chest pain, arm weakness/numbness, slurred speech, or shortness of breath     Discharge Instruction - Breathing exercises   Order Comments: Perform breathing exercises using your Incentive Spirometer 10 times per hour while awake for 2 weeks.     Symptoms - Fever Management   Order Comments: A low grade fever can be expected after surgery.  Use acetaminophen (TYLENOL) as needed for fever management.  Contact your Surgeon Team if you have a fever greater than 101.5 F, chills, and/or night sweats.     Symptoms - Constipation management   Order Comments: Constipation (hard, dry bowel movements) is expected after surgery due to the combination of being less active, the anesthetic, and the opioid pain medication.  You can do the following to help reduce " constipation:  ~  FLUIDS:  Drink clear liquids (water or Gatorade), or juice (apple/prune).  ~  DIET:  Eat a fiber rich diet.    ~  ACTIVITY:  Get up and move around several times a day.  Increase your activity as you are able.  MEDICATIONS:  Reduce the risk of constipation by starting medications before you are constipated.  You can take Miralax   (1 packet as directed) and/or a stool softener (Senokot 1-2 tablets 1-2 times a day).  If you already have constipation and these medications are not working, you can get magnesium citrate and use as directed.  If you continue to have constipation you can try an over the counter suppository or enema.  Call your Surgeon Team if it has been greater than 3 days since your last bowel movement.     Symptoms - Reduced Urine Output   Order Comments: Changes in the amount of fluids you drank before and after surgery may result in problems urinating.  It is important to stay well-hydrated after surgery and drink plenty of water. If it has been greater than 8 hours since you have urinated despite drinking plenty of water, call your Surgeon Team.     Activity - Exercises to prevent blood clots   Order Comments: Unless otherwise directed by your Surgeon team, perform the following exercises at least three times per day for the first four weeks after surgery to prevent blood clots in your legs: 1) Point and flex your feet (Ankle Pumps), 2) Move your ankle around in big circles, 3) Wiggle your toes, 4) Walk, even for short distances, several times a day, will help decrease the risk of blood clots.     Order Specific Question Answer Comments   Is discharge order? Yes      Comfort and Pain Management - Pain after Surgery   Order Comments: Pain after surgery is normal and expected.  You will have some amount of pain for several weeks after surgery.  Your pain will improve with time.  There are several things you can do to help reduce your pain including: rest, ice, elevation, and using  pain medications as needed. Contact your Surgeon Team if you have pain that persists or worsens after surgery despite rest, ice, elevation, and taking your medication(s) as prescribed. Contact your Surgeon Team if you have new numbness, tingling, or weakness in your operative extremity.     Comfort and Pain Management - Swelling after Surgery   Order Comments: Swelling and/or bruising of the surgical extremity is common and may persist for several months after surgery. In addition to frequent icing and elevation, gentle compressive support with an ACE wrap or tubigrip may help with swelling. Apply compression regularly, removing at least twice daily to perform skin checks. Contact your Surgeon Team if your swelling increases and is NOT associated with an increase in your activity level, or if your swelling increases and is associated with redness and pain.     Comfort and Pain Management - Cold therapy   Order Comments: Ice can be used to control swelling and discomfort after surgery. Place a thin towel over your operative site and apply the ice pack overtop. Leave ice pack in place for 20 minutes, then remove for 20 minutes. Repeat this 20 minutes on/20 minutes off routine as often as tolerated.     Medication Instructions - Acetaminophen (TYLENOL) Instructions   Order Comments: You were discharged with acetaminophen (TYLENOL) for pain management after surgery. Acetaminophen most effectively manages pain symptoms when it is taken on a schedule without missing doses (every four, six, or eight hours). Your Provider will prescribe a safe daily dose between 3000 - 4000 mg.  Do NOT exceed this daily dose. Most patients use acetaminophen for pain control for the first four weeks after surgery.  You can wean from this medication as your pain decreases.     Medication Instructions - Muscle relaxant Medication Instructions   Order Comments: You were discharged with a muscle relaxer medication that can be used in conjunction  with acetaminophen (TYLENOL) and the opioid medication to manage pain symptoms. Take the muscle relaxant medication exactly as directed.     Follow Up Care   Order Comments: Follow-up with your Surgeon Team in 6 weeks for wound check.     Medication instructions - No pharmacologic VTE prophylaxis prescribed   Order Comments: Your Surgeon did not prescribe medication for anticoagulation.     Opioid Instructions (Less than 65 years)   Order Comments: You were discharged with an opioid medication (hydromorphone, oxycodone, hydrocodone, or tramadol). This medication should only be taken for breakthrough pain that is not controlled with acetaminophen (TYLENOL). If you rate your pain less than 3 you do not need this medication. Pain rating 0-3: You do not need this medication. Pain rating 4-6: Take 1 tablet every 4-6 hours as needed Pain rating 7-10: Take 2 tablets every 4-6 hours as needed. Do not exceed 6 tablets per day     Medication Instructions - Opioids - Tapering Instructions   Order Comments: In the first three days following surgery, your symptoms may warrant use of the narcotic pain medication every four to six hours as prescribed. This is normal. As your pain symptoms improve, focus your efforts on decreasing (tapering) use of narcotic medications. The most successful tapering strategy is to first, decrease the number of tablets you take every 4-6 hours to the minimum prescribed. Then, increase the amount of time between doses. For example: First, taper to   or 1 tablet every 4-6 hours. Then, taper to   or 1 tablet every 6-8 hours. Then, taper to   or 1 tablet every 8-10 hours. Then, taper to   or 1 tablet every 10-12 hours. Then, taper to   or 1 tablet at bedtime. The bedtime dose can help with comfort during sleep and is typically the last dose to be discontinued after surgery.     Medication Instructions - NSAID Instructions   Order Comments: You may also use NSAIDs to manage your surgical pain. Some common  anti-inflammatories include: ibuprofen (ADVIL, MOTRIN), naproxen (ALEVE, NAPROSYN), celecoxib (CELEBREX), meloxicam (MOBIC), ketorolac (TORADOL).     Discharge Instruction - Regular Diet Adult   Order Comments: Return to your pre-surgery diet unless instructed otherwise     Order Specific Question Answer Comments   Is discharge order? Yes            Edmundo Egan MD  PGY-4 Orthopaedic Surgery

## 2025-07-09 ENCOUNTER — MYC MEDICAL ADVICE (OUTPATIENT)
Dept: ORTHOPEDICS | Facility: CLINIC | Age: 45
End: 2025-07-09
Payer: MEDICARE

## 2025-07-09 NOTE — LETTER
Return to Work  July 10, 2025         Patient:  Kevyn Dillon  :   1980  MRN:     [id number]  Physician: PARAMJIT CROCKER    Kevyn Dillon is recovering from Spine surgery & may not return to work until Reevaluated at first postop appt. 25.    Thanks for your consideration.        Electronically signed by Paramjit Crocker MD

## 2025-07-10 NOTE — TELEPHONE ENCOUNTER
See My chart from pt requesting work letter.  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  I asked pt what DR.Sembran lim told pt before surgery & pt stated 6 weeks off work but might only be 4 weeks prn.    Placed letter off work until RTN appt 8-14-25 & pt will print from My chart & give to HR.  Call back prn.  Pt agreed.  S.O./Padma Padron RN.

## 2025-07-15 ENCOUNTER — PATIENT OUTREACH (OUTPATIENT)
Dept: CARE COORDINATION | Facility: CLINIC | Age: 45
End: 2025-07-15
Payer: MEDICARE

## 2025-07-17 ENCOUNTER — PATIENT OUTREACH (OUTPATIENT)
Dept: CARE COORDINATION | Facility: CLINIC | Age: 45
End: 2025-07-17
Payer: MEDICARE

## 2025-07-26 NOTE — CONFIDENTIAL NOTE
Diagnoses: Sjogren's syndrome     NOTES Status Details   OFFICE NOTE from referring provider In process Andre Mario DO [place]   OFFICE NOTE from other specialist     DISCHARGE SUMMARY from hospital     DISCHARGE REPORT from the ER     MEDICATION LIST     LABS (Any and all labs)          Biopsy/pathology (Anything related to diagnoses I.e. fluid aspirations, lip biopsy, muscle biopsy)               Imaging (All imaging related to diagnoses)     Echo     HRCT     CXR     EMG                    Scleroderma/Dermatomyositis diagnoses     Previous Cardiology notes      Previous Pulmonary notes     Previous Dermatology notes     Previous GI notes     Lupus diagnoses     Previous Nephrology notes     Previous Dermatology notes     Previous Cardiology notes

## 2025-08-13 ENCOUNTER — PRE VISIT (OUTPATIENT)
Dept: RHEUMATOLOGY | Facility: CLINIC | Age: 45
End: 2025-08-13
Payer: MEDICARE

## 2025-08-13 ENCOUNTER — OFFICE VISIT (OUTPATIENT)
Dept: RHEUMATOLOGY | Facility: CLINIC | Age: 45
End: 2025-08-13
Payer: MEDICARE

## 2025-08-13 VITALS
DIASTOLIC BLOOD PRESSURE: 82 MMHG | BODY MASS INDEX: 24.08 KG/M2 | SYSTOLIC BLOOD PRESSURE: 126 MMHG | HEIGHT: 71 IN | HEART RATE: 59 BPM | WEIGHT: 172 LBS

## 2025-08-13 DIAGNOSIS — M13.0 SERONEGATIVE POLYARTHRITIS: ICD-10-CM

## 2025-08-13 DIAGNOSIS — M35.00 SJOGREN'S SYNDROME: ICD-10-CM

## 2025-08-13 DIAGNOSIS — M35.00 SICCA SYNDROME: Primary | ICD-10-CM

## 2025-08-13 DIAGNOSIS — H04.129 DRYNESS OF EYE: ICD-10-CM

## 2025-08-13 ASSESSMENT — PAIN SCALES - GENERAL: PAINLEVEL_OUTOF10: SEVERE PAIN (7)

## 2025-08-14 ENCOUNTER — OFFICE VISIT (OUTPATIENT)
Dept: ORTHOPEDICS | Facility: CLINIC | Age: 45
End: 2025-08-14
Payer: MEDICARE

## 2025-08-14 DIAGNOSIS — Z98.890 S/P CERVICAL DISC REPLACEMENT: Primary | ICD-10-CM

## 2025-08-14 PROCEDURE — 99024 POSTOP FOLLOW-UP VISIT: CPT | Performed by: ORTHOPAEDIC SURGERY

## 2025-08-15 ENCOUNTER — VIRTUAL VISIT (OUTPATIENT)
Dept: PHARMACY | Facility: CLINIC | Age: 45
End: 2025-08-15
Payer: MEDICARE

## 2025-08-15 DIAGNOSIS — M13.0 SERONEGATIVE POLYARTHRITIS: Primary | ICD-10-CM

## 2025-08-15 RX ORDER — OXYCODONE AND ACETAMINOPHEN 10; 325 MG/1; MG/1
1 TABLET ORAL
COMMUNITY

## 2025-08-19 ENCOUNTER — TELEPHONE (OUTPATIENT)
Dept: RHEUMATOLOGY | Facility: CLINIC | Age: 45
End: 2025-08-19
Payer: MEDICARE

## (undated) DEVICE — DECANTER FLUID L3 IN TRANSFER STRL LF DYNJDEC03

## (undated) DEVICE — GLOVE PROTEXIS MICRO 8.0 LT BLUE 2D73PM80

## (undated) DEVICE — SOL ISOPROPYL RUBBING ALCOHOL USP 70% 4OZ HDX-20 I0020

## (undated) DEVICE — IMM COLLAR CERVICAL MED UNIVERSAL 2.5X24" 79-83520

## (undated) DEVICE — SU VICRYL 3-0 PS-1 18" UND J683

## (undated) DEVICE — TAPE TRANSPORE 1"X1.5YD 1534S-1

## (undated) DEVICE — CATH TRAY FOLEY SURESTEP 16FR WDRAIN BAG STLK LATEX A300316A

## (undated) DEVICE — TOOL DISSECT MIDAS MR8 14CM MATCH HEAD 3MM MR8-14MH30

## (undated) DEVICE — GLOVE PROTEXIS BLUE W/NEU-THERA 8.5  2D73EB85

## (undated) DEVICE — LINEN BACK PACK 5440

## (undated) DEVICE — PROTECTOR ARM LG FOAM TRENDELENBURG TAP200

## (undated) DEVICE — PREP CHLORAPREP 26ML TINTED HI-LITE ORANGE 930815

## (undated) DEVICE — LINEN TOWEL PACK X5 5464

## (undated) DEVICE — SOLUTION WATER 1000ML BOTTLE R5000-01

## (undated) DEVICE — DRAPE MICROSCOPE MICRO-KOVER LEICA 48"X120" 09-MK651

## (undated) DEVICE — POSITIONER ARMBOARD FOAM CONVOLUTE CP-501

## (undated) DEVICE — DRAPE C-ARM W/STRAPS 42X72" 07-CA104

## (undated) DEVICE — SU VICRYL 2-0 SH 27" UND J417H

## (undated) DEVICE — SU MONOCRYL 4-0 PS-2 18" UND Y496G

## (undated) DEVICE — DRAIN JACKSON PRATT 07FR ROUND SU130-1320

## (undated) DEVICE — TAPE DURAPORE 3" SILK 1538-3

## (undated) DEVICE — Device

## (undated) DEVICE — SUCTION MANIFOLD NEPTUNE 2 SYS 4 PORT 0702-020-000

## (undated) DEVICE — SU SILK 2-0 SH 30" K833H

## (undated) DEVICE — NDL ANGIOCATH 14GA 1.25" 4048

## (undated) DEVICE — RX SURGIFLO HEMOSTATIC MATRIX W/THROMBIN 8ML 2994

## (undated) RX ORDER — OXYCODONE HYDROCHLORIDE 10 MG/1
TABLET ORAL
Status: DISPENSED
Start: 2025-06-30

## (undated) RX ORDER — HYDROMORPHONE HYDROCHLORIDE 1 MG/ML
INJECTION, SOLUTION INTRAMUSCULAR; INTRAVENOUS; SUBCUTANEOUS
Status: DISPENSED
Start: 2025-06-30

## (undated) RX ORDER — ACETAMINOPHEN 325 MG/1
TABLET ORAL
Status: DISPENSED
Start: 2025-06-30

## (undated) RX ORDER — GABAPENTIN 300 MG/1
CAPSULE ORAL
Status: DISPENSED
Start: 2025-06-30

## (undated) RX ORDER — LIDOCAINE HYDROCHLORIDE 10 MG/ML
INJECTION, SOLUTION EPIDURAL; INFILTRATION; INTRACAUDAL; PERINEURAL
Status: DISPENSED
Start: 2021-09-28

## (undated) RX ORDER — FENTANYL CITRATE 50 UG/ML
INJECTION, SOLUTION INTRAMUSCULAR; INTRAVENOUS
Status: DISPENSED
Start: 2025-06-30

## (undated) RX ORDER — DEXAMETHASONE SODIUM PHOSPHATE 4 MG/ML
INJECTION, SOLUTION INTRA-ARTICULAR; INTRALESIONAL; INTRAMUSCULAR; INTRAVENOUS; SOFT TISSUE
Status: DISPENSED
Start: 2025-06-30

## (undated) RX ORDER — OXYCODONE HYDROCHLORIDE 5 MG/1
TABLET ORAL
Status: DISPENSED
Start: 2025-06-30

## (undated) RX ORDER — LIDOCAINE HYDROCHLORIDE 10 MG/ML
INJECTION, SOLUTION EPIDURAL; INFILTRATION; INTRACAUDAL; PERINEURAL
Status: DISPENSED
Start: 2025-03-03

## (undated) RX ORDER — LIDOCAINE HYDROCHLORIDE 10 MG/ML
INJECTION, SOLUTION EPIDURAL; INFILTRATION; INTRACAUDAL; PERINEURAL
Status: DISPENSED
Start: 2021-04-06

## (undated) RX ORDER — CEFAZOLIN SODIUM/WATER 2 G/20 ML
SYRINGE (ML) INTRAVENOUS
Status: DISPENSED
Start: 2025-06-30

## (undated) RX ORDER — LIDOCAINE HYDROCHLORIDE AND EPINEPHRINE 10; 10 MG/ML; UG/ML
INJECTION, SOLUTION INFILTRATION; PERINEURAL
Status: DISPENSED
Start: 2022-02-22

## (undated) RX ORDER — DEXAMETHASONE SODIUM PHOSPHATE 10 MG/ML
INJECTION, SOLUTION INTRAMUSCULAR; INTRAVENOUS
Status: DISPENSED
Start: 2025-03-03

## (undated) RX ORDER — PROPOFOL 10 MG/ML
INJECTION, EMULSION INTRAVENOUS
Status: DISPENSED
Start: 2025-06-30